# Patient Record
Sex: FEMALE | Race: BLACK OR AFRICAN AMERICAN | NOT HISPANIC OR LATINO
[De-identification: names, ages, dates, MRNs, and addresses within clinical notes are randomized per-mention and may not be internally consistent; named-entity substitution may affect disease eponyms.]

---

## 2017-07-25 PROBLEM — Z00.00 ENCOUNTER FOR PREVENTIVE HEALTH EXAMINATION: Status: ACTIVE | Noted: 2017-07-25

## 2017-08-16 ENCOUNTER — APPOINTMENT (OUTPATIENT)
Dept: OTOLARYNGOLOGY | Facility: CLINIC | Age: 42
End: 2017-08-16
Payer: COMMERCIAL

## 2017-08-16 VITALS
DIASTOLIC BLOOD PRESSURE: 100 MMHG | HEART RATE: 73 BPM | SYSTOLIC BLOOD PRESSURE: 166 MMHG | HEIGHT: 68 IN | WEIGHT: 204 LBS | BODY MASS INDEX: 30.92 KG/M2

## 2017-08-16 DIAGNOSIS — D49.0 NEOPLASM OF UNSPECIFIED BEHAVIOR OF DIGESTIVE SYSTEM: ICD-10-CM

## 2017-08-16 DIAGNOSIS — I10 ESSENTIAL (PRIMARY) HYPERTENSION: ICD-10-CM

## 2017-08-16 DIAGNOSIS — J45.909 UNSPECIFIED ASTHMA, UNCOMPLICATED: ICD-10-CM

## 2017-08-16 DIAGNOSIS — K21.0 GASTRO-ESOPHAGEAL REFLUX DISEASE WITH ESOPHAGITIS: ICD-10-CM

## 2017-08-16 DIAGNOSIS — J30.2 OTHER SEASONAL ALLERGIC RHINITIS: ICD-10-CM

## 2017-08-16 DIAGNOSIS — M26.623 ARTHRALGIA OF BILATERAL TEMPOROMANDIBULAR JOINT: ICD-10-CM

## 2017-08-16 DIAGNOSIS — Z82.49 FAMILY HISTORY OF ISCHEMIC HEART DISEASE AND OTHER DISEASES OF THE CIRCULATORY SYSTEM: ICD-10-CM

## 2017-08-16 DIAGNOSIS — R49.8 OTHER VOICE AND RESONANCE DISORDERS: ICD-10-CM

## 2017-08-16 DIAGNOSIS — H69.80 OTHER SPECIFIED DISORDERS OF EUSTACHIAN TUBE, UNSPECIFIED EAR: ICD-10-CM

## 2017-08-16 DIAGNOSIS — Z80.0 FAMILY HISTORY OF MALIGNANT NEOPLASM OF DIGESTIVE ORGANS: ICD-10-CM

## 2017-08-16 DIAGNOSIS — D50.9 IRON DEFICIENCY ANEMIA, UNSPECIFIED: ICD-10-CM

## 2017-08-16 DIAGNOSIS — Z78.9 OTHER SPECIFIED HEALTH STATUS: ICD-10-CM

## 2017-08-16 DIAGNOSIS — H92.03 OTALGIA, BILATERAL: ICD-10-CM

## 2017-08-16 DIAGNOSIS — Z80.49 FAMILY HISTORY OF MALIGNANT NEOPLASM OF OTHER GENITAL ORGANS: ICD-10-CM

## 2017-08-16 DIAGNOSIS — J35.1 HYPERTROPHY OF TONSILS: ICD-10-CM

## 2017-08-16 PROCEDURE — 92588 EVOKED AUDITORY TST COMPLETE: CPT

## 2017-08-16 PROCEDURE — 31575 DIAGNOSTIC LARYNGOSCOPY: CPT

## 2017-08-16 PROCEDURE — 92557 COMPREHENSIVE HEARING TEST: CPT

## 2017-08-16 PROCEDURE — 92550 TYMPANOMETRY & REFLEX THRESH: CPT

## 2017-08-16 PROCEDURE — 99204 OFFICE O/P NEW MOD 45 MIN: CPT | Mod: 25

## 2017-08-17 PROBLEM — H69.80 EUSTACHIAN TUBE DYSFUNCTION: Status: ACTIVE | Noted: 2017-08-17

## 2017-09-26 PROBLEM — Z80.49 FAMILY HISTORY OF MALIGNANT NEOPLASM OF UTERUS: Status: ACTIVE | Noted: 2017-09-26

## 2017-09-26 PROBLEM — H92.03 EAR PAIN, BILATERAL: Status: ACTIVE | Noted: 2017-09-26

## 2017-09-26 PROBLEM — J45.909 ASTHMA: Status: ACTIVE | Noted: 2017-09-26

## 2017-09-26 PROBLEM — R49.8 OTHER VOICE DISTURBANCE: Status: ACTIVE | Noted: 2017-09-26

## 2017-09-26 PROBLEM — D49.0 PAROTID NEOPLASM: Status: RESOLVED | Noted: 2017-09-26 | Resolved: 2017-09-26

## 2017-09-26 PROBLEM — D50.9 IRON DEFICIENCY ANEMIA: Status: ACTIVE | Noted: 2017-09-26

## 2017-09-26 PROBLEM — K21.0 CHRONIC REFLUX ESOPHAGITIS: Status: ACTIVE | Noted: 2017-09-26

## 2017-09-26 PROBLEM — I10 HYPERTENSION: Status: ACTIVE | Noted: 2017-09-26

## 2017-09-26 PROBLEM — J30.2 SEASONAL ALLERGIC RHINITIS, UNSPECIFIED CHRONICITY, UNSPECIFIED TRIGGER: Status: ACTIVE | Noted: 2017-09-26

## 2017-09-26 PROBLEM — Z82.49 FAMILY HISTORY OF HYPERTENSION: Status: ACTIVE | Noted: 2017-08-16

## 2017-09-26 PROBLEM — J35.1 HYPERTROPHY OF TONSIL: Status: ACTIVE | Noted: 2017-09-26

## 2017-09-26 PROBLEM — M26.623 BILATERAL TEMPOROMANDIBULAR JOINT PAIN: Status: ACTIVE | Noted: 2017-09-26

## 2017-09-26 RX ORDER — CETIRIZINE HCL 10 MG
10 TABLET ORAL
Refills: 0 | Status: ACTIVE | COMMUNITY

## 2017-09-26 RX ORDER — ESOMEPRAZOLE MAGNESIUM 20 MG/1
20 CAPSULE, DELAYED RELEASE ORAL
Refills: 0 | Status: ACTIVE | COMMUNITY

## 2018-07-25 PROBLEM — Z78.9 ALCOHOL USE: Status: ACTIVE | Noted: 2017-08-16

## 2018-07-25 PROBLEM — Z80.0 FAMILY HISTORY OF COLON CANCER: Status: ACTIVE | Noted: 2017-09-26

## 2022-06-24 ENCOUNTER — APPOINTMENT (OUTPATIENT)
Dept: COLORECTAL SURGERY | Facility: CLINIC | Age: 47
End: 2022-06-24

## 2022-06-24 VITALS
TEMPERATURE: 98.2 F | HEART RATE: 79 BPM | OXYGEN SATURATION: 98 % | WEIGHT: 214 LBS | HEIGHT: 68 IN | BODY MASS INDEX: 32.43 KG/M2 | DIASTOLIC BLOOD PRESSURE: 106 MMHG | SYSTOLIC BLOOD PRESSURE: 157 MMHG

## 2022-06-24 PROCEDURE — 99202 OFFICE O/P NEW SF 15 MIN: CPT

## 2022-06-24 NOTE — ASSESSMENT
[FreeTextEntry1] : 47 f W/ Crohn's (on Remicade); keen to have her colostomy reversed.\par Plan:\par Flex sigmoidoscopy to confirm non-inflamed anorectum.\par MRI scan - to assess anal fistula disease.\par Will plan for surgery after reviewing the above.\par All questions answered.

## 2022-06-24 NOTE — HISTORY OF PRESENT ILLNESS
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 Apart from occasional feeling of weakness, no other symptoms.\par She is enquiring about possible stoma reversal.

## 2022-06-24 NOTE — PHYSICAL EXAM
[Abdomen Masses] : No abdominal masses [Abdomen Tenderness] : ~T No ~M abdominal tenderness [Tender] : nontender [None] : no anal fissures seen [Normal Breath Sounds] : Normal breath sounds [No Rash or Lesion] : No rash or lesion [de-identified] : Normal; healthy functional colostomy [de-identified] : Perianal exam: scars of previous surgeries; no active fistulas [de-identified] : Normal [de-identified] : N [de-identified] : N [de-identified] : N [de-identified] : N [de-identified] : N [de-identified] : N [de-identified] : N

## 2022-07-07 ENCOUNTER — APPOINTMENT (OUTPATIENT)
Dept: COLORECTAL SURGERY | Facility: CLINIC | Age: 47
End: 2022-07-07

## 2022-07-07 PROCEDURE — 99212 OFFICE O/P EST SF 10 MIN: CPT

## 2022-07-07 NOTE — HISTORY OF PRESENT ILLNESS
[FreeTextEntry1] : 47 F, w/ hx of Crohn's drisease; recently underwent I&D of perianal abscess at [facility]. she is receiving Remicade by Dr Plata.\par She has completed the antibiotic course, and is doing sitz baths.\par She is having regular bowel movements.\par Reports some pain and she will be reaching out to her team at [facility] for analgesics.\par

## 2022-07-07 NOTE — ASSESSMENT
[FreeTextEntry1] : 47 F w/ hx of Crohn's disease; s/p I&D of perianal abscess at [facility].\par Doing better; still has some anal pain; On exam - no active abscess.\par Plan:\par Sitz baths.\par Analgesics as needed.\par Will schedule for EUA, possible seton placenment, possible cutting seton placement after a month.\par All questions answered.

## 2022-07-07 NOTE — PHYSICAL EXAM
[No HSM] : no hepatosplenomegaly [None] : no anal fissures seen [No Rash or Lesion] : No rash or lesion [Abdomen Masses] : No abdominal masses [Abdomen Tenderness] : ~T No ~M abdominal tenderness [de-identified] : Normal [de-identified] : No active abscess; no tenderness [de-identified] : Normal [de-identified] : N [de-identified] : N [de-identified] : N [de-identified] : N [de-identified] : N [de-identified] : N [de-identified] : N

## 2022-08-17 ENCOUNTER — NON-APPOINTMENT (OUTPATIENT)
Age: 47
End: 2022-08-17

## 2022-08-22 NOTE — ASU PATIENT PROFILE, ADULT - NSICDXPASTSURGICALHX_GEN_ALL_CORE_FT
PAST SURGICAL HISTORY:  Colostomy present LLQ, no leakage noted. Stoma red    H/O myomectomy     History of surgery left nek tumor excision

## 2022-08-22 NOTE — ASU PATIENT PROFILE, ADULT - FALL HARM RISK - UNIVERSAL INTERVENTIONS
Bed in lowest position, wheels locked, appropriate side rails in place/Call bell, personal items and telephone in reach/Instruct patient to call for assistance before getting out of bed or chair/Non-slip footwear when patient is out of bed/[place] to call system/Physically safe environment - no spills, clutter or unnecessary equipment/Purposeful Proactive Rounding/Room/bathroom lighting operational, light cord in reach

## 2022-08-22 NOTE — ASU PATIENT PROFILE, ADULT - NSICDXPASTMEDICALHX_GEN_ALL_CORE_FT
PAST MEDICAL HISTORY:  2019 novel coronavirus disease (COVID-19) 2020    Acid reflux     Anemia     Asthma     Crohn's disease     High blood pressure     Syncope r/t anemia

## 2022-08-22 NOTE — ASU PATIENT PROFILE, ADULT - ABLE TO REACH PT
Voicemail instruction left. Patient instructed to arrive at 8AM. No solid food, dairy, candy or gum after midnight, water is allowed before 7:00am tomorrow. Pt reminded to come with photo ID, vaccination and insurance card, escort must show proof of vaccination and photo ID. No jewelries/contact lens; dress in comfortable clothes; no smoking/alcohol drinking/illicit drug use tonight. Address and call back number given./no

## 2022-08-23 ENCOUNTER — APPOINTMENT (OUTPATIENT)
Dept: COLORECTAL SURGERY | Facility: CLINIC | Age: 47
End: 2022-08-23

## 2022-08-23 ENCOUNTER — TRANSCRIPTION ENCOUNTER (OUTPATIENT)
Age: 47
End: 2022-08-23

## 2022-08-23 ENCOUNTER — OUTPATIENT (OUTPATIENT)
Dept: OUTPATIENT SERVICES | Facility: HOSPITAL | Age: 47
LOS: 1 days | Discharge: HOME | End: 2022-08-23

## 2022-08-23 VITALS
WEIGHT: 227.08 LBS | HEART RATE: 68 BPM | TEMPERATURE: 97 F | RESPIRATION RATE: 16 BRPM | HEIGHT: 68 IN | OXYGEN SATURATION: 100 % | DIASTOLIC BLOOD PRESSURE: 82 MMHG | SYSTOLIC BLOOD PRESSURE: 138 MMHG

## 2022-08-23 VITALS
DIASTOLIC BLOOD PRESSURE: 92 MMHG | TEMPERATURE: 97 F | HEART RATE: 74 BPM | RESPIRATION RATE: 16 BRPM | SYSTOLIC BLOOD PRESSURE: 139 MMHG | OXYGEN SATURATION: 97 %

## 2022-08-23 DIAGNOSIS — Z93.3 COLOSTOMY STATUS: Chronic | ICD-10-CM

## 2022-08-23 DIAGNOSIS — Z98.890 OTHER SPECIFIED POSTPROCEDURAL STATES: Chronic | ICD-10-CM

## 2022-08-23 PROCEDURE — ZZZZZ: CPT

## 2022-08-23 PROCEDURE — 46600 DIAGNOSTIC ANOSCOPY SPX: CPT

## 2022-08-23 RX ORDER — SODIUM CHLORIDE 9 MG/ML
1000 INJECTION, SOLUTION INTRAVENOUS
Refills: 0 | Status: DISCONTINUED | OUTPATIENT
Start: 2022-08-23 | End: 2022-08-23

## 2022-08-23 RX ORDER — BENZOCAINE AND MENTHOL 5; 1 G/100ML; G/100ML
1 LIQUID ORAL ONCE
Refills: 0 | Status: COMPLETED | OUTPATIENT
Start: 2022-08-23 | End: 2022-08-23

## 2022-08-23 RX ORDER — OXYCODONE AND ACETAMINOPHEN 5; 325 MG/1; MG/1
1 TABLET ORAL EVERY 4 HOURS
Refills: 0 | Status: DISCONTINUED | OUTPATIENT
Start: 2022-08-23 | End: 2022-08-23

## 2022-08-23 RX ORDER — FENTANYL CITRATE 50 UG/ML
25 INJECTION INTRAVENOUS
Refills: 0 | Status: DISCONTINUED | OUTPATIENT
Start: 2022-08-23 | End: 2022-08-23

## 2022-08-23 RX ORDER — ONDANSETRON 8 MG/1
4 TABLET, FILM COATED ORAL ONCE
Refills: 0 | Status: DISCONTINUED | OUTPATIENT
Start: 2022-08-23 | End: 2022-08-23

## 2022-08-23 RX ADMIN — BENZOCAINE AND MENTHOL 1 LOZENGE: 5; 1 LIQUID ORAL at 10:57

## 2022-08-23 NOTE — ASU DISCHARGE PLAN (ADULT/PEDIATRIC) - ASU DC SPECIAL INSTRUCTIONSFT
Will have a small amount of bleeding today.    No fistulas were found on exam. Will be in contact about scheduling colostomy reversal.

## 2022-08-23 NOTE — ASU DISCHARGE PLAN (ADULT/PEDIATRIC) - NS MD DC FALL RISK RISK
For information on Fall & Injury Prevention, visit: [email or web address] OR  [email or web address] OR  https://www.cdc.gov/steadi/patient.html

## 2022-08-23 NOTE — ASU PREOP CHECKLIST - SITE MARKED BY SURGEON
Discharge and education instructions reviewed. Patient verbalized understanding, teach-back successful. Patient denied questions at this time. No acute distress noted. Patient instructed to follow-up as noted - return to emergency department if symptoms worsen. Patient verbalized understanding. Discharged per EDMD with discharged instructions.        Wm Singer RN  02/26/20 3253
Dr. Kayla You at bedside.       Jose Nicholas RN  02/26/20 6861
Handoff received from [facility] to assume care of pt.       Keyon Chiang, JOSE  02/26/20 2800
Pt out of room by stretcher to CT.       Janes Sears RN  02/26/20 1245
dos

## 2022-09-08 PROBLEM — J45.909 UNSPECIFIED ASTHMA, UNCOMPLICATED: Chronic | Status: ACTIVE | Noted: 2022-08-23

## 2022-09-08 PROBLEM — I10 ESSENTIAL (PRIMARY) HYPERTENSION: Chronic | Status: ACTIVE | Noted: 2022-08-23

## 2022-09-08 PROBLEM — R55 SYNCOPE AND COLLAPSE: Chronic | Status: ACTIVE | Noted: 2022-08-23

## 2022-09-08 PROBLEM — K21.9 GASTRO-ESOPHAGEAL REFLUX DISEASE WITHOUT ESOPHAGITIS: Chronic | Status: ACTIVE | Noted: 2022-08-23

## 2022-09-08 PROBLEM — K50.90 CROHN'S DISEASE, UNSPECIFIED, WITHOUT COMPLICATIONS: Chronic | Status: ACTIVE | Noted: 2022-08-23

## 2022-09-15 ENCOUNTER — APPOINTMENT (OUTPATIENT)
Dept: COLORECTAL SURGERY | Facility: CLINIC | Age: 47
End: 2022-09-15

## 2022-09-22 ENCOUNTER — APPOINTMENT (OUTPATIENT)
Dept: COLORECTAL SURGERY | Facility: CLINIC | Age: 47
End: 2022-09-22

## 2022-09-30 ENCOUNTER — APPOINTMENT (OUTPATIENT)
Dept: COLORECTAL SURGERY | Facility: CLINIC | Age: 47
End: 2022-09-30

## 2022-09-30 VITALS
DIASTOLIC BLOOD PRESSURE: 118 MMHG | BODY MASS INDEX: 34.71 KG/M2 | TEMPERATURE: 98.4 F | SYSTOLIC BLOOD PRESSURE: 197 MMHG | HEART RATE: 65 BPM | HEIGHT: 68 IN | OXYGEN SATURATION: 100 % | WEIGHT: 229 LBS

## 2022-09-30 DIAGNOSIS — K60.3 ANAL FISTULA: ICD-10-CM

## 2022-09-30 PROCEDURE — 99212 OFFICE O/P EST SF 10 MIN: CPT

## 2022-09-30 RX ORDER — PREDNISONE 20 MG/1
20 TABLET ORAL DAILY
Qty: 5 | Refills: 0 | Status: COMPLETED | COMMUNITY
Start: 2017-08-17 | End: 2022-09-30

## 2022-09-30 RX ORDER — FOLIC ACID 1 MG/1
1 TABLET ORAL
Refills: 0 | Status: COMPLETED | COMMUNITY
End: 2022-09-30

## 2022-09-30 RX ORDER — FLUTICASONE PROPIONATE 220 MCG
AEROSOL WITH ADAPTER (GRAM) INHALATION
Refills: 0 | Status: COMPLETED | COMMUNITY
End: 2022-09-30

## 2022-09-30 RX ORDER — FLUTICASONE PROPIONATE 50 UG/1
50 SPRAY, METERED NASAL DAILY
Qty: 1 | Refills: 3 | Status: COMPLETED | COMMUNITY
Start: 2017-08-17 | End: 2022-09-30

## 2022-09-30 RX ORDER — IRON/IRON ASP GLY/FA/MV-MIN 38 125-25-1MG
TABLET ORAL
Refills: 0 | Status: COMPLETED | COMMUNITY
End: 2022-09-30

## 2022-09-30 RX ORDER — MONTELUKAST SODIUM 10 MG/1
TABLET, FILM COATED ORAL
Refills: 0 | Status: COMPLETED | COMMUNITY
End: 2022-09-30

## 2022-09-30 RX ORDER — ALBUTEROL SULFATE 90 UG/1
INHALANT RESPIRATORY (INHALATION)
Refills: 0 | Status: COMPLETED | COMMUNITY
End: 2022-09-30

## 2022-09-30 NOTE — HISTORY OF PRESENT ILLNESS
[FreeTextEntry1] : 47 year old female patient s/p EUA and anoscopy on 8/23/22.  Pt with history of Crohn's, anal fistula, abscess and diverting colostomy \par \par Here for follow up\par Pt feeling tired, recovering from Covid\par Ostomy is good, denies leaks\par Has shoulder, joint and hip pain on left side\par Urge to pass a BM through her rectum\par Denies fevers\par Appetite is good\par Hoping to do a reversal soon.\par \par Her recent EUA showed no active anal fistula or abscess.\par \par Had a Remicade infusion on 9/6 next one is in November\par \par

## 2022-09-30 NOTE — PHYSICAL EXAM
[Exam Deferred] : exam was deferred [No Rash or Lesion] : No rash or lesion [Alert] : alert [Oriented to Person] : oriented to person [Oriented to Place] : oriented to place [Oriented to Time] : oriented to time [Calm] : calm [de-identified] : Normal w/ functioning colostomy [de-identified] : Normal [de-identified] : Normal [de-identified] : Normal [de-identified] : Normal [de-identified] : Normal

## 2022-09-30 NOTE — ASSESSMENT
[FreeTextEntry1] : 47 F w/ hx of Crohn's.\par Her anal fistulas and abscesses have healed.\par Plan:\par To see her GI MD for clearance for colostomy reversal.\par Will schedule for surgery once we get the clearance.\par All questions answered.

## 2022-10-07 NOTE — HISTORY OF PRESENT ILLNESS
[FreeTextEntry1] : 47 year old female patient s/p EUA and anoscopy on 8/23.  Pt with history Crohn's, anal fistula and abscess and diverting colostomy\par \par Here for follow up

## 2022-10-27 ENCOUNTER — NON-APPOINTMENT (OUTPATIENT)
Age: 47
End: 2022-10-27

## 2022-11-28 DIAGNOSIS — Z01.818 ENCOUNTER FOR OTHER PREPROCEDURAL EXAMINATION: ICD-10-CM

## 2022-11-29 ENCOUNTER — APPOINTMENT (OUTPATIENT)
Dept: COLORECTAL SURGERY | Facility: HOSPITAL | Age: 47
End: 2022-11-29

## 2022-12-04 ENCOUNTER — NON-APPOINTMENT (OUTPATIENT)
Age: 47
End: 2022-12-04

## 2022-12-08 VITALS
HEART RATE: 86 BPM | HEIGHT: 68 IN | SYSTOLIC BLOOD PRESSURE: 163 MMHG | DIASTOLIC BLOOD PRESSURE: 94 MMHG | TEMPERATURE: 98 F | OXYGEN SATURATION: 100 % | WEIGHT: 224.65 LBS | RESPIRATION RATE: 16 BRPM

## 2022-12-08 RX ORDER — FLUTICASONE PROPIONATE 220 MCG
0 AEROSOL WITH ADAPTER (GRAM) INHALATION
Qty: 0 | Refills: 0 | DISCHARGE

## 2022-12-08 RX ORDER — NIFEDIPINE 30 MG
1 TABLET, EXTENDED RELEASE 24 HR ORAL
Qty: 0 | Refills: 0 | DISCHARGE

## 2022-12-08 RX ORDER — INFLUENZA VIRUS VACCINE 15; 15; 15; 15 UG/.5ML; UG/.5ML; UG/.5ML; UG/.5ML
0.5 SUSPENSION INTRAMUSCULAR ONCE
Refills: 0 | Status: DISCONTINUED | OUTPATIENT
Start: 2022-12-09 | End: 2022-12-12

## 2022-12-08 NOTE — PRE-OP CHECKLIST - ADVANCE DIRECTIVE ADDRESSED/READDRESSED
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

## 2022-12-09 ENCOUNTER — INPATIENT (INPATIENT)
Facility: HOSPITAL | Age: 47
LOS: 2 days | Discharge: HOME | DRG: 331 | End: 2022-12-12
Attending: COLON & RECTAL SURGERY | Admitting: COLON & RECTAL SURGERY
Payer: COMMERCIAL

## 2022-12-09 ENCOUNTER — APPOINTMENT (OUTPATIENT)
Dept: COLORECTAL SURGERY | Facility: HOSPITAL | Age: 47
End: 2022-12-09

## 2022-12-09 ENCOUNTER — RESULT REVIEW (OUTPATIENT)
Age: 47
End: 2022-12-09

## 2022-12-09 ENCOUNTER — TRANSCRIPTION ENCOUNTER (OUTPATIENT)
Age: 47
End: 2022-12-09

## 2022-12-09 DIAGNOSIS — Z98.890 OTHER SPECIFIED POSTPROCEDURAL STATES: Chronic | ICD-10-CM

## 2022-12-09 DIAGNOSIS — R11.0 NAUSEA: ICD-10-CM

## 2022-12-09 DIAGNOSIS — J45.909 UNSPECIFIED ASTHMA, UNCOMPLICATED: ICD-10-CM

## 2022-12-09 DIAGNOSIS — Z88.6 ALLERGY STATUS TO ANALGESIC AGENT: ICD-10-CM

## 2022-12-09 DIAGNOSIS — Z43.3 ENCOUNTER FOR ATTENTION TO COLOSTOMY: ICD-10-CM

## 2022-12-09 DIAGNOSIS — I10 ESSENTIAL (PRIMARY) HYPERTENSION: ICD-10-CM

## 2022-12-09 DIAGNOSIS — Z90.49 ACQUIRED ABSENCE OF OTHER SPECIFIED PARTS OF DIGESTIVE TRACT: Chronic | ICD-10-CM

## 2022-12-09 DIAGNOSIS — Z93.3 COLOSTOMY STATUS: Chronic | ICD-10-CM

## 2022-12-09 DIAGNOSIS — Z86.16 PERSONAL HISTORY OF COVID-19: ICD-10-CM

## 2022-12-09 DIAGNOSIS — K21.9 GASTRO-ESOPHAGEAL REFLUX DISEASE WITHOUT ESOPHAGITIS: ICD-10-CM

## 2022-12-09 LAB
BLD GP AB SCN SERPL QL: NEGATIVE — SIGNIFICANT CHANGE UP
GLUCOSE BLDC GLUCOMTR-MCNC: 119 MG/DL — HIGH (ref 70–99)
RH IG SCN BLD-IMP: POSITIVE — SIGNIFICANT CHANGE UP

## 2022-12-09 PROCEDURE — ZZZZZ: CPT

## 2022-12-09 PROCEDURE — 44620 REPAIR BOWEL OPENING: CPT

## 2022-12-09 PROCEDURE — 88304 TISSUE EXAM BY PATHOLOGIST: CPT | Mod: 26

## 2022-12-09 RX ORDER — CETIRIZINE HYDROCHLORIDE 10 MG/1
1 TABLET ORAL
Qty: 0 | Refills: 0 | DISCHARGE

## 2022-12-09 RX ORDER — NIFEDIPINE 30 MG
60 TABLET, EXTENDED RELEASE 24 HR ORAL DAILY
Refills: 0 | Status: DISCONTINUED | OUTPATIENT
Start: 2022-12-10 | End: 2022-12-12

## 2022-12-09 RX ORDER — FAMOTIDINE 10 MG/ML
20 INJECTION INTRAVENOUS ONCE
Refills: 0 | Status: COMPLETED | OUTPATIENT
Start: 2022-12-09 | End: 2022-12-09

## 2022-12-09 RX ORDER — BENZOCAINE AND MENTHOL 5; 1 G/100ML; G/100ML
1 LIQUID ORAL ONCE
Refills: 0 | Status: COMPLETED | OUTPATIENT
Start: 2022-12-09 | End: 2022-12-09

## 2022-12-09 RX ORDER — OXYCODONE HYDROCHLORIDE 5 MG/1
2.5 TABLET ORAL EVERY 4 HOURS
Refills: 0 | Status: DISCONTINUED | OUTPATIENT
Start: 2022-12-10 | End: 2022-12-12

## 2022-12-09 RX ORDER — ALBUTEROL 90 UG/1
2 AEROSOL, METERED ORAL
Qty: 0 | Refills: 0 | DISCHARGE

## 2022-12-09 RX ORDER — SODIUM CHLORIDE 9 MG/ML
1000 INJECTION, SOLUTION INTRAVENOUS
Refills: 0 | Status: DISCONTINUED | OUTPATIENT
Start: 2022-12-09 | End: 2022-12-12

## 2022-12-09 RX ORDER — ACETAMINOPHEN 500 MG
1000 TABLET ORAL ONCE
Refills: 0 | Status: COMPLETED | OUTPATIENT
Start: 2022-12-09 | End: 2022-12-09

## 2022-12-09 RX ORDER — ACETAMINOPHEN 500 MG
1000 TABLET ORAL EVERY 6 HOURS
Refills: 0 | Status: COMPLETED | OUTPATIENT
Start: 2022-12-09 | End: 2022-12-10

## 2022-12-09 RX ORDER — HYDROMORPHONE HYDROCHLORIDE 2 MG/ML
0.25 INJECTION INTRAMUSCULAR; INTRAVENOUS; SUBCUTANEOUS ONCE
Refills: 0 | Status: DISCONTINUED | OUTPATIENT
Start: 2022-12-09 | End: 2022-12-09

## 2022-12-09 RX ORDER — ESOMEPRAZOLE MAGNESIUM 40 MG/1
1 CAPSULE, DELAYED RELEASE ORAL
Qty: 0 | Refills: 0 | DISCHARGE

## 2022-12-09 RX ORDER — HEPARIN SODIUM 5000 [USP'U]/ML
5000 INJECTION INTRAVENOUS; SUBCUTANEOUS EVERY 8 HOURS
Refills: 0 | Status: DISCONTINUED | OUTPATIENT
Start: 2022-12-09 | End: 2022-12-12

## 2022-12-09 RX ORDER — HEPARIN SODIUM 5000 [USP'U]/ML
5000 INJECTION INTRAVENOUS; SUBCUTANEOUS ONCE
Refills: 0 | Status: COMPLETED | OUTPATIENT
Start: 2022-12-09 | End: 2022-12-09

## 2022-12-09 RX ORDER — OXYCODONE HYDROCHLORIDE 5 MG/1
5 TABLET ORAL EVERY 4 HOURS
Refills: 0 | Status: DISCONTINUED | OUTPATIENT
Start: 2022-12-10 | End: 2022-12-12

## 2022-12-09 RX ORDER — ONDANSETRON 8 MG/1
4 TABLET, FILM COATED ORAL EVERY 6 HOURS
Refills: 0 | Status: DISCONTINUED | OUTPATIENT
Start: 2022-12-09 | End: 2022-12-12

## 2022-12-09 RX ORDER — ONDANSETRON 8 MG/1
4 TABLET, FILM COATED ORAL ONCE
Refills: 0 | Status: COMPLETED | OUTPATIENT
Start: 2022-12-09 | End: 2022-12-09

## 2022-12-09 RX ORDER — NIFEDIPINE 30 MG
1 TABLET, EXTENDED RELEASE 24 HR ORAL
Qty: 0 | Refills: 0 | DISCHARGE

## 2022-12-09 RX ADMIN — BENZOCAINE AND MENTHOL 1 LOZENGE: 5; 1 LIQUID ORAL at 23:42

## 2022-12-09 RX ADMIN — Medication 400 MILLIGRAM(S): at 12:42

## 2022-12-09 RX ADMIN — Medication 1000 MILLIGRAM(S): at 17:15

## 2022-12-09 RX ADMIN — FAMOTIDINE 20 MILLIGRAM(S): 10 INJECTION INTRAVENOUS at 11:07

## 2022-12-09 RX ADMIN — Medication 1000 MILLIGRAM(S): at 07:10

## 2022-12-09 RX ADMIN — ONDANSETRON 4 MILLIGRAM(S): 8 TABLET, FILM COATED ORAL at 11:07

## 2022-12-09 RX ADMIN — Medication 400 MILLIGRAM(S): at 23:42

## 2022-12-09 RX ADMIN — Medication 400 MILLIGRAM(S): at 17:01

## 2022-12-09 RX ADMIN — HEPARIN SODIUM 5000 UNIT(S): 5000 INJECTION INTRAVENOUS; SUBCUTANEOUS at 07:11

## 2022-12-09 RX ADMIN — HYDROMORPHONE HYDROCHLORIDE 0.25 MILLIGRAM(S): 2 INJECTION INTRAMUSCULAR; INTRAVENOUS; SUBCUTANEOUS at 11:07

## 2022-12-09 RX ADMIN — ONDANSETRON 4 MILLIGRAM(S): 8 TABLET, FILM COATED ORAL at 16:32

## 2022-12-09 RX ADMIN — SODIUM CHLORIDE 40 MILLILITER(S): 9 INJECTION, SOLUTION INTRAVENOUS at 12:42

## 2022-12-09 RX ADMIN — HYDROMORPHONE HYDROCHLORIDE 0.25 MILLIGRAM(S): 2 INJECTION INTRAMUSCULAR; INTRAVENOUS; SUBCUTANEOUS at 11:14

## 2022-12-09 RX ADMIN — HEPARIN SODIUM 5000 UNIT(S): 5000 INJECTION INTRAVENOUS; SUBCUTANEOUS at 17:01

## 2022-12-09 RX ADMIN — Medication 1000 MILLIGRAM(S): at 13:40

## 2022-12-09 NOTE — H&P ADULT - NSHPPHYSICALEXAM_GEN_ALL_CORE
Gastrointestinal: No abdominal masses. No abdominal tenderness. Normal.   Liver:. no hepatosplenomegaly.   Rectal :. No active abscess; no tenderness. No anal fissures seen.   General Appearance: Normal.   HEENT: N.   Neck:. N.   Respiratory:. N.   Genitourinary: N.   Musculoskeletal: N.   Skin:. no rash or lesion.   Neurologic:. N.   Psychiatric:. N.

## 2022-12-09 NOTE — H&P ADULT - NSICDXPASTMEDICALHX_GEN_ALL_CORE_FT
PAST MEDICAL HISTORY:  2019 novel coronavirus disease (COVID-19) 2020; 9/2022    Acid reflux     Anal fistula     Asthma     Crohn's disease     High blood pressure     Syncope r/t anemia

## 2022-12-09 NOTE — PRE-ANESTHESIA EVALUATION ADULT - NSANTHOSAYNRD_GEN_A_CORE
No. SONA screening performed.  STOP BANG Legend: 0-2 = LOW Risk; 3-4 = INTERMEDIATE Risk; 5-8 = HIGH Risk

## 2022-12-09 NOTE — H&P ADULT - NSICDXPASTSURGICALHX_GEN_ALL_CORE_FT
PAST SURGICAL HISTORY:  Colostomy present     H/O myomectomy     History of colon resection 12/2021    History of surgery left neck tumor excision

## 2022-12-09 NOTE — BRIEF OPERATIVE NOTE - OPERATION/FINDINGS
circumferential incision around colostomy,deepened to fascia . transverse colostomy freed from surrounding adhesions.  colostomy trimmed. closure in double layers; first with undyed vicryl 3/0 in running manner followed by silk 3/0 also in running manner.  closure fascia with pds 1/0 in interrupted manner. washout of wound  with saline.  subcut layer closed with vicryl 3/0 in interrupted fashion.25mls of  Marcain 0.25% infiltrated.

## 2022-12-09 NOTE — H&P ADULT - ASSESSMENT
This is 46yo s/p Colostomy (2021) or crohns perianal disease. Had biological treatment. EUA 2022 no active perianal  crohns disease. She is here for colostomy  reversal.  1. Consent  2. Heparin 3. Type/SCreen.

## 2022-12-09 NOTE — PROGRESS NOTE ADULT - SUBJECTIVE AND OBJECTIVE BOX
Post Op Check:     12/9: colostomy reversal     SUBJECTIVE: Pt seen and examined at bedside this am by surgery team. Pain well controlled. Denies f/n/v/cp/sob.    MEDICATIONS  (STANDING):  acetaminophen   IVPB .. 1000 milliGRAM(s) IV Intermittent every 6 hours  heparin   Injectable 5000 Unit(s) SubCutaneous every 8 hours  influenza   Vaccine 0.5 milliLiter(s) IntraMuscular once  lactated ringers. 1000 milliLiter(s) (40 mL/Hr) IV Continuous <Continuous>    MEDICATIONS  (PRN):      Vital Signs Last 24 Hrs  T(C): 36.5 (09 Dec 2022 12:38), Max: 36.6 (09 Dec 2022 09:44)  T(F): 97.7 (09 Dec 2022 12:38), Max: 97.8 (09 Dec 2022 09:44)  HR: 89 (09 Dec 2022 12:38) (70 - 89)  BP: 142/87 (09 Dec 2022 12:38) (114/64 - 163/94)  BP(mean): 107 (09 Dec 2022 12:02) (85 - 107)  RR: 17 (09 Dec 2022 12:38) (16 - 19)  SpO2: 100% (09 Dec 2022 12:38) (99% - 100%)    Parameters below as of 09 Dec 2022 12:38  Patient On (Oxygen Delivery Method): mask, nonrebreather  O2 Flow (L/min): 10      PHYSICAL EXAM:  Constitutional: A&Ox3  Respiratory: non labored breathing, no respiratory distress  Cardiovascular: NSR, RRR  Gastrointestinal: soft, nondistended, appropriately tender to palpation   Incision: site c/d/i   Genitourinary: pending TOV   Extremities: WWP, no calf tenderness or edema, SCDs in place                   I&O's Detail      LABS:                RADIOLOGY & ADDITIONAL STUDIES:

## 2022-12-09 NOTE — H&P ADULT - HISTORY OF PRESENT ILLNESS
This is a 48yo lady s/p Colostomy for Crohns perianal disease. Underwent biological Rx by GI. She has completed her treatment and is now  for reversal. She has no active infection.

## 2022-12-10 LAB
ANION GAP SERPL CALC-SCNC: 12 MMOL/L — SIGNIFICANT CHANGE UP (ref 5–17)
BUN SERPL-MCNC: 20 MG/DL — SIGNIFICANT CHANGE UP (ref 7–23)
CALCIUM SERPL-MCNC: 9.2 MG/DL — SIGNIFICANT CHANGE UP (ref 8.4–10.5)
CHLORIDE SERPL-SCNC: 98 MMOL/L — SIGNIFICANT CHANGE UP (ref 96–108)
CO2 SERPL-SCNC: 25 MMOL/L — SIGNIFICANT CHANGE UP (ref 22–31)
CREAT SERPL-MCNC: 1.42 MG/DL — HIGH (ref 0.5–1.3)
EGFR: 46 ML/MIN/1.73M2 — LOW
GLUCOSE SERPL-MCNC: 125 MG/DL — HIGH (ref 70–99)
HCT VFR BLD CALC: 33 % — LOW (ref 34.5–45)
HGB BLD-MCNC: 11 G/DL — LOW (ref 11.5–15.5)
MAGNESIUM SERPL-MCNC: 1.9 MG/DL — SIGNIFICANT CHANGE UP (ref 1.6–2.6)
MCHC RBC-ENTMCNC: 31.9 PG — SIGNIFICANT CHANGE UP (ref 27–34)
MCHC RBC-ENTMCNC: 33.3 GM/DL — SIGNIFICANT CHANGE UP (ref 32–36)
MCV RBC AUTO: 95.7 FL — SIGNIFICANT CHANGE UP (ref 80–100)
NRBC # BLD: 0 /100 WBCS — SIGNIFICANT CHANGE UP (ref 0–0)
PHOSPHATE SERPL-MCNC: 3.4 MG/DL — SIGNIFICANT CHANGE UP (ref 2.5–4.5)
PLATELET # BLD AUTO: 220 K/UL — SIGNIFICANT CHANGE UP (ref 150–400)
POTASSIUM SERPL-MCNC: 4.2 MMOL/L — SIGNIFICANT CHANGE UP (ref 3.5–5.3)
POTASSIUM SERPL-SCNC: 4.2 MMOL/L — SIGNIFICANT CHANGE UP (ref 3.5–5.3)
RBC # BLD: 3.45 M/UL — LOW (ref 3.8–5.2)
RBC # FLD: 13.2 % — SIGNIFICANT CHANGE UP (ref 10.3–14.5)
SODIUM SERPL-SCNC: 135 MMOL/L — SIGNIFICANT CHANGE UP (ref 135–145)
WBC # BLD: 14.62 K/UL — HIGH (ref 3.8–10.5)
WBC # FLD AUTO: 14.62 K/UL — HIGH (ref 3.8–10.5)

## 2022-12-10 RX ORDER — SIMETHICONE 80 MG/1
80 TABLET, CHEWABLE ORAL ONCE
Refills: 0 | Status: COMPLETED | OUTPATIENT
Start: 2022-12-10 | End: 2022-12-10

## 2022-12-10 RX ORDER — ALBUTEROL 90 UG/1
2 AEROSOL, METERED ORAL EVERY 6 HOURS
Refills: 0 | Status: DISCONTINUED | OUTPATIENT
Start: 2022-12-10 | End: 2022-12-12

## 2022-12-10 RX ORDER — SODIUM CHLORIDE 9 MG/ML
500 INJECTION, SOLUTION INTRAVENOUS ONCE
Refills: 0 | Status: COMPLETED | OUTPATIENT
Start: 2022-12-10 | End: 2022-12-10

## 2022-12-10 RX ORDER — LANOLIN ALCOHOL/MO/W.PET/CERES
5 CREAM (GRAM) TOPICAL AT BEDTIME
Refills: 0 | Status: DISCONTINUED | OUTPATIENT
Start: 2022-12-10 | End: 2022-12-12

## 2022-12-10 RX ORDER — LANOLIN ALCOHOL/MO/W.PET/CERES
1 CREAM (GRAM) TOPICAL AT BEDTIME
Refills: 0 | Status: DISCONTINUED | OUTPATIENT
Start: 2022-12-10 | End: 2022-12-10

## 2022-12-10 RX ORDER — ACETAMINOPHEN 500 MG
650 TABLET ORAL EVERY 6 HOURS
Refills: 0 | Status: DISCONTINUED | OUTPATIENT
Start: 2022-12-10 | End: 2022-12-12

## 2022-12-10 RX ADMIN — Medication 650 MILLIGRAM(S): at 21:38

## 2022-12-10 RX ADMIN — ONDANSETRON 4 MILLIGRAM(S): 8 TABLET, FILM COATED ORAL at 17:29

## 2022-12-10 RX ADMIN — HEPARIN SODIUM 5000 UNIT(S): 5000 INJECTION INTRAVENOUS; SUBCUTANEOUS at 00:03

## 2022-12-10 RX ADMIN — Medication 1000 MILLIGRAM(S): at 00:00

## 2022-12-10 RX ADMIN — ONDANSETRON 4 MILLIGRAM(S): 8 TABLET, FILM COATED ORAL at 06:26

## 2022-12-10 RX ADMIN — ALBUTEROL 2 PUFF(S): 90 AEROSOL, METERED ORAL at 12:05

## 2022-12-10 RX ADMIN — Medication 400 MILLIGRAM(S): at 05:39

## 2022-12-10 RX ADMIN — Medication 650 MILLIGRAM(S): at 22:38

## 2022-12-10 RX ADMIN — Medication 5 MILLIGRAM(S): at 23:54

## 2022-12-10 RX ADMIN — HEPARIN SODIUM 5000 UNIT(S): 5000 INJECTION INTRAVENOUS; SUBCUTANEOUS at 09:57

## 2022-12-10 RX ADMIN — SIMETHICONE 80 MILLIGRAM(S): 80 TABLET, CHEWABLE ORAL at 02:18

## 2022-12-10 RX ADMIN — ONDANSETRON 4 MILLIGRAM(S): 8 TABLET, FILM COATED ORAL at 00:54

## 2022-12-10 RX ADMIN — Medication 60 MILLIGRAM(S): at 05:40

## 2022-12-10 RX ADMIN — HEPARIN SODIUM 5000 UNIT(S): 5000 INJECTION INTRAVENOUS; SUBCUTANEOUS at 17:13

## 2022-12-10 RX ADMIN — SODIUM CHLORIDE 500 MILLILITER(S): 9 INJECTION, SOLUTION INTRAVENOUS at 08:58

## 2022-12-10 RX ADMIN — SODIUM CHLORIDE 40 MILLILITER(S): 9 INJECTION, SOLUTION INTRAVENOUS at 15:00

## 2022-12-10 NOTE — PROGRESS NOTE ADULT - SUBJECTIVE AND OBJECTIVE BOX
STATUS POST:  12/9: Colostomy reversal    POST OPERATIVE DAY #: 1    SUBJECTIVE: Pt seen and examined at bedside this am by surgery team. Tolerating clear liquid diet, has not yet passed gas or had bowel movement. Will continue to monitor return of bowel function. Has ambulated in hallway. Packing changed to WTD. Albuterol ordered.  Pain well controlled. Denies f/n/v/cp/sob.    Vital Signs Last 24 Hrs  T(C): 37.1 (10 Dec 2022 08:42), Max: 37.1 (10 Dec 2022 08:42)  T(F): 98.7 (10 Dec 2022 08:42), Max: 98.7 (10 Dec 2022 08:42)  HR: 79 (10 Dec 2022 08:42) (70 - 104)  BP: 153/93 (10 Dec 2022 08:42) (122/86 - 153/93)  BP(mean): 107 (09 Dec 2022 12:02) (96 - 107)  RR: 17 (10 Dec 2022 08:42) (16 - 19)  SpO2: 97% (10 Dec 2022 08:42) (97% - 100%)    Parameters below as of 10 Dec 2022 08:42  Patient On (Oxygen Delivery Method): room air        PHYSICAL EXAM:   Gen: Awake, alert, NAD, resting comfortably   CV: RRR  Pulm: no respiratory distress on RA  Abd: soft, ND, NTTP, no rebound or guarding   Ext: WWP, no edema, SCDs in place     I&O's Detail    09 Dec 2022 07:01  -  10 Dec 2022 07:00  --------------------------------------------------------  IN:    Lactated Ringers: 760 mL    Oral Fluid: 360 mL  Total IN: 1120 mL    OUT:    Voided (mL): 625 mL  Total OUT: 625 mL    Total NET: 495 mL      10 Dec 2022 07:01  -  10 Dec 2022 10:12  --------------------------------------------------------  IN:    Lactated Ringers: 160 mL    Lactated Ringers Bolus: 500 mL  Total IN: 660 mL    OUT:    Voided (mL): 430 mL  Total OUT: 430 mL    Total NET: 230 mL          LABS:                        11.0   14.62 )-----------( 220      ( 10 Dec 2022 05:30 )             33.0     12-10    135  |  98  |  20  ----------------------------<  125<H>  4.2   |  25  |  1.42<H>    Ca    9.2      10 Dec 2022 05:30  Phos  3.4     12-10  Mg     1.9     12-10          CAPILLARY BLOOD GLUCOSE          RADIOLOGY & ADDITIONAL STUDIES:   STATUS POST:  12/9: Colostomy reversal    POST OPERATIVE DAY #: 1    SUBJECTIVE: Pt seen and examined at bedside this am by surgery team. Tolerating clear liquid diet, has not yet passed gas or had bowel movement. Will continue to monitor return of bowel function. Has ambulated in hallway. Packing changed to WTD. Was coughing at bedside this morning, restarted home Albuterol .  Pain well controlled. Denies f/n/v/cp/sob.    Vital Signs Last 24 Hrs  T(C): 37.1 (10 Dec 2022 08:42), Max: 37.1 (10 Dec 2022 08:42)  T(F): 98.7 (10 Dec 2022 08:42), Max: 98.7 (10 Dec 2022 08:42)  HR: 79 (10 Dec 2022 08:42) (70 - 104)  BP: 153/93 (10 Dec 2022 08:42) (122/86 - 153/93)  BP(mean): 107 (09 Dec 2022 12:02) (96 - 107)  RR: 17 (10 Dec 2022 08:42) (16 - 19)  SpO2: 97% (10 Dec 2022 08:42) (97% - 100%)    Parameters below as of 10 Dec 2022 08:42  Patient On (Oxygen Delivery Method): room air        PHYSICAL EXAM:   Gen: Awake, alert, NAD, resting comfortably   CV: RRR  Pulm: coughing, no respiratory distress on RA, no wheezing   Abd: soft, ND, appropriately tender, no rebound or guarding, incision c/d/i, betadine packing removed and packed with WTD   Ext: WWP, no edema, SCDs in place     I&O's Detail    09 Dec 2022 07:01  -  10 Dec 2022 07:00  --------------------------------------------------------  IN:    Lactated Ringers: 760 mL    Oral Fluid: 360 mL  Total IN: 1120 mL    OUT:    Voided (mL): 625 mL  Total OUT: 625 mL    Total NET: 495 mL      10 Dec 2022 07:01  -  10 Dec 2022 10:12  --------------------------------------------------------  IN:    Lactated Ringers: 160 mL    Lactated Ringers Bolus: 500 mL  Total IN: 660 mL    OUT:    Voided (mL): 430 mL  Total OUT: 430 mL    Total NET: 230 mL          LABS:                        11.0   14.62 )-----------( 220      ( 10 Dec 2022 05:30 )             33.0     12-10    135  |  98  |  20  ----------------------------<  125<H>  4.2   |  25  |  1.42<H>    Ca    9.2      10 Dec 2022 05:30  Phos  3.4     12-10  Mg     1.9     12-10          CAPILLARY BLOOD GLUCOSE          RADIOLOGY & ADDITIONAL STUDIES:

## 2022-12-11 LAB
ANION GAP SERPL CALC-SCNC: 9 MMOL/L — SIGNIFICANT CHANGE UP (ref 5–17)
BUN SERPL-MCNC: 13 MG/DL — SIGNIFICANT CHANGE UP (ref 7–23)
CALCIUM SERPL-MCNC: 8.9 MG/DL — SIGNIFICANT CHANGE UP (ref 8.4–10.5)
CHLORIDE SERPL-SCNC: 103 MMOL/L — SIGNIFICANT CHANGE UP (ref 96–108)
CO2 SERPL-SCNC: 27 MMOL/L — SIGNIFICANT CHANGE UP (ref 22–31)
CREAT SERPL-MCNC: 1.04 MG/DL — SIGNIFICANT CHANGE UP (ref 0.5–1.3)
EGFR: 66 ML/MIN/1.73M2 — SIGNIFICANT CHANGE UP
GLUCOSE SERPL-MCNC: 94 MG/DL — SIGNIFICANT CHANGE UP (ref 70–99)
HCT VFR BLD CALC: 31.2 % — LOW (ref 34.5–45)
HGB BLD-MCNC: 10.2 G/DL — LOW (ref 11.5–15.5)
MAGNESIUM SERPL-MCNC: 2 MG/DL — SIGNIFICANT CHANGE UP (ref 1.6–2.6)
MCHC RBC-ENTMCNC: 32 PG — SIGNIFICANT CHANGE UP (ref 27–34)
MCHC RBC-ENTMCNC: 32.7 GM/DL — SIGNIFICANT CHANGE UP (ref 32–36)
MCV RBC AUTO: 97.8 FL — SIGNIFICANT CHANGE UP (ref 80–100)
NRBC # BLD: 0 /100 WBCS — SIGNIFICANT CHANGE UP (ref 0–0)
PHOSPHATE SERPL-MCNC: 2.5 MG/DL — SIGNIFICANT CHANGE UP (ref 2.5–4.5)
PLATELET # BLD AUTO: 197 K/UL — SIGNIFICANT CHANGE UP (ref 150–400)
POTASSIUM SERPL-MCNC: 3.8 MMOL/L — SIGNIFICANT CHANGE UP (ref 3.5–5.3)
POTASSIUM SERPL-SCNC: 3.8 MMOL/L — SIGNIFICANT CHANGE UP (ref 3.5–5.3)
RBC # BLD: 3.19 M/UL — LOW (ref 3.8–5.2)
RBC # FLD: 13.3 % — SIGNIFICANT CHANGE UP (ref 10.3–14.5)
SODIUM SERPL-SCNC: 139 MMOL/L — SIGNIFICANT CHANGE UP (ref 135–145)
WBC # BLD: 10.09 K/UL — SIGNIFICANT CHANGE UP (ref 3.8–10.5)
WBC # FLD AUTO: 10.09 K/UL — SIGNIFICANT CHANGE UP (ref 3.8–10.5)

## 2022-12-11 RX ORDER — POTASSIUM CHLORIDE 20 MEQ
20 PACKET (EA) ORAL ONCE
Refills: 0 | Status: COMPLETED | OUTPATIENT
Start: 2022-12-11 | End: 2022-12-11

## 2022-12-11 RX ORDER — PANTOPRAZOLE SODIUM 20 MG/1
40 TABLET, DELAYED RELEASE ORAL
Refills: 0 | Status: DISCONTINUED | OUTPATIENT
Start: 2022-12-11 | End: 2022-12-12

## 2022-12-11 RX ADMIN — Medication 20 MILLIEQUIVALENT(S): at 13:29

## 2022-12-11 RX ADMIN — PANTOPRAZOLE SODIUM 40 MILLIGRAM(S): 20 TABLET, DELAYED RELEASE ORAL at 18:20

## 2022-12-11 RX ADMIN — HEPARIN SODIUM 5000 UNIT(S): 5000 INJECTION INTRAVENOUS; SUBCUTANEOUS at 01:04

## 2022-12-11 RX ADMIN — ONDANSETRON 4 MILLIGRAM(S): 8 TABLET, FILM COATED ORAL at 06:09

## 2022-12-11 RX ADMIN — Medication 1000 MILLIGRAM(S): at 06:00

## 2022-12-11 RX ADMIN — Medication 60 MILLIGRAM(S): at 05:39

## 2022-12-11 RX ADMIN — Medication 5 MILLIGRAM(S): at 21:29

## 2022-12-11 RX ADMIN — HEPARIN SODIUM 5000 UNIT(S): 5000 INJECTION INTRAVENOUS; SUBCUTANEOUS at 17:53

## 2022-12-11 RX ADMIN — HEPARIN SODIUM 5000 UNIT(S): 5000 INJECTION INTRAVENOUS; SUBCUTANEOUS at 09:29

## 2022-12-11 NOTE — PROGRESS NOTE ADULT - SUBJECTIVE AND OBJECTIVE BOX
STATUS POST:  Reversal of colostomy or ileostomy        POST OPERATIVE DAY #: 2    SUBJECTIVE: Pt seen and examined by chief resident. Pt is doing well, resting comfortably on bed. Pain controlled. Diet tolerated. Ambulating out of bed. +F/+BM. No nausea or vomiting. No complaints at this time.    Vital Signs Last 24 Hrs  T(C): 37.2 (11 Dec 2022 05:00), Max: 37.2 (11 Dec 2022 05:00)  T(F): 98.9 (11 Dec 2022 05:00), Max: 98.9 (11 Dec 2022 05:00)  HR: 79 (11 Dec 2022 05:00) (79 - 98)  BP: 134/89 (11 Dec 2022 05:00) (134/76 - 153/93)  BP(mean): 104 (11 Dec 2022 05:00) (97 - 104)  RR: 17 (11 Dec 2022 05:00) (17 - 17)  SpO2: 94% (11 Dec 2022 05:00) (94% - 99%)    Parameters below as of 11 Dec 2022 05:00  Patient On (Oxygen Delivery Method): room air        I&O's Summary    10 Dec 2022 07:01  -  11 Dec 2022 07:00  --------------------------------------------------------  IN: 1420 mL / OUT: 830 mL / NET: 590 mL        Physical Exam:  General Appearance: Appears well, NAD  Pulmonary: Nonlabored breathing, no respiratory distress  Abdomen: Soft, nondisteded, prior ostomy site clean and dry and intact, site repacked with wet to dry, healing well  Extremities: WWP, SCD's in place     LABS:                        11.0   14.62 )-----------( 220      ( 10 Dec 2022 05:30 )             33.0     12-10    135  |  98  |  20  ----------------------------<  125<H>  4.2   |  25  |  1.42<H>    Ca    9.2      10 Dec 2022 05:30  Phos  3.4     12-10  Mg     1.9     12-10

## 2022-12-12 ENCOUNTER — TRANSCRIPTION ENCOUNTER (OUTPATIENT)
Age: 47
End: 2022-12-12

## 2022-12-12 VITALS
RESPIRATION RATE: 17 BRPM | SYSTOLIC BLOOD PRESSURE: 118 MMHG | DIASTOLIC BLOOD PRESSURE: 65 MMHG | HEART RATE: 98 BPM | OXYGEN SATURATION: 98 % | TEMPERATURE: 98 F

## 2022-12-12 LAB
ANION GAP SERPL CALC-SCNC: 12 MMOL/L — SIGNIFICANT CHANGE UP (ref 5–17)
BUN SERPL-MCNC: 12 MG/DL — SIGNIFICANT CHANGE UP (ref 7–23)
CALCIUM SERPL-MCNC: 9 MG/DL — SIGNIFICANT CHANGE UP (ref 8.4–10.5)
CHLORIDE SERPL-SCNC: 98 MMOL/L — SIGNIFICANT CHANGE UP (ref 96–108)
CO2 SERPL-SCNC: 25 MMOL/L — SIGNIFICANT CHANGE UP (ref 22–31)
CREAT SERPL-MCNC: 0.93 MG/DL — SIGNIFICANT CHANGE UP (ref 0.5–1.3)
EGFR: 76 ML/MIN/1.73M2 — SIGNIFICANT CHANGE UP
GLUCOSE SERPL-MCNC: 90 MG/DL — SIGNIFICANT CHANGE UP (ref 70–99)
HCT VFR BLD CALC: 30 % — LOW (ref 34.5–45)
HGB BLD-MCNC: 9.8 G/DL — LOW (ref 11.5–15.5)
MAGNESIUM SERPL-MCNC: 2 MG/DL — SIGNIFICANT CHANGE UP (ref 1.6–2.6)
MCHC RBC-ENTMCNC: 31.5 PG — SIGNIFICANT CHANGE UP (ref 27–34)
MCHC RBC-ENTMCNC: 32.7 GM/DL — SIGNIFICANT CHANGE UP (ref 32–36)
MCV RBC AUTO: 96.5 FL — SIGNIFICANT CHANGE UP (ref 80–100)
NRBC # BLD: 0 /100 WBCS — SIGNIFICANT CHANGE UP (ref 0–0)
PHOSPHATE SERPL-MCNC: 2.5 MG/DL — SIGNIFICANT CHANGE UP (ref 2.5–4.5)
PLATELET # BLD AUTO: 209 K/UL — SIGNIFICANT CHANGE UP (ref 150–400)
POTASSIUM SERPL-MCNC: 3.6 MMOL/L — SIGNIFICANT CHANGE UP (ref 3.5–5.3)
POTASSIUM SERPL-SCNC: 3.6 MMOL/L — SIGNIFICANT CHANGE UP (ref 3.5–5.3)
RBC # BLD: 3.11 M/UL — LOW (ref 3.8–5.2)
RBC # FLD: 13.3 % — SIGNIFICANT CHANGE UP (ref 10.3–14.5)
SODIUM SERPL-SCNC: 135 MMOL/L — SIGNIFICANT CHANGE UP (ref 135–145)
WBC # BLD: 8.33 K/UL — SIGNIFICANT CHANGE UP (ref 3.8–10.5)
WBC # FLD AUTO: 8.33 K/UL — SIGNIFICANT CHANGE UP (ref 3.8–10.5)

## 2022-12-12 PROCEDURE — 84100 ASSAY OF PHOSPHORUS: CPT

## 2022-12-12 PROCEDURE — 86900 BLOOD TYPING SEROLOGIC ABO: CPT

## 2022-12-12 PROCEDURE — 80048 BASIC METABOLIC PNL TOTAL CA: CPT

## 2022-12-12 PROCEDURE — 85027 COMPLETE CBC AUTOMATED: CPT

## 2022-12-12 PROCEDURE — 88304 TISSUE EXAM BY PATHOLOGIST: CPT

## 2022-12-12 PROCEDURE — 94640 AIRWAY INHALATION TREATMENT: CPT

## 2022-12-12 PROCEDURE — 86901 BLOOD TYPING SEROLOGIC RH(D): CPT

## 2022-12-12 PROCEDURE — 86850 RBC ANTIBODY SCREEN: CPT

## 2022-12-12 PROCEDURE — 83735 ASSAY OF MAGNESIUM: CPT

## 2022-12-12 PROCEDURE — 36415 COLL VENOUS BLD VENIPUNCTURE: CPT

## 2022-12-12 PROCEDURE — 82962 GLUCOSE BLOOD TEST: CPT

## 2022-12-12 RX ORDER — DOCUSATE SODIUM 100 MG
1 CAPSULE ORAL
Qty: 14 | Refills: 0
Start: 2022-12-12 | End: 2022-12-18

## 2022-12-12 RX ORDER — OXYCODONE AND ACETAMINOPHEN 5; 325 MG/1; MG/1
1 TABLET ORAL
Qty: 16 | Refills: 0
Start: 2022-12-12 | End: 2022-12-15

## 2022-12-12 RX ORDER — HYDROCHLOROTHIAZIDE 25 MG
1 TABLET ORAL
Qty: 0 | Refills: 0 | DISCHARGE

## 2022-12-12 RX ORDER — HYDROCHLOROTHIAZIDE 25 MG
1 TABLET ORAL
Qty: 0 | Refills: 0 | DISCHARGE
Start: 2022-12-12

## 2022-12-12 RX ORDER — ONDANSETRON 8 MG/1
4 TABLET, FILM COATED ORAL ONCE
Refills: 0 | Status: COMPLETED | OUTPATIENT
Start: 2022-12-12 | End: 2022-12-12

## 2022-12-12 RX ORDER — POTASSIUM CHLORIDE 20 MEQ
40 PACKET (EA) ORAL ONCE
Refills: 0 | Status: COMPLETED | OUTPATIENT
Start: 2022-12-12 | End: 2022-12-12

## 2022-12-12 RX ORDER — SODIUM,POTASSIUM PHOSPHATES 278-250MG
1 POWDER IN PACKET (EA) ORAL ONCE
Refills: 0 | Status: COMPLETED | OUTPATIENT
Start: 2022-12-12 | End: 2022-12-12

## 2022-12-12 RX ADMIN — Medication 40 MILLIEQUIVALENT(S): at 08:37

## 2022-12-12 RX ADMIN — ONDANSETRON 4 MILLIGRAM(S): 8 TABLET, FILM COATED ORAL at 14:30

## 2022-12-12 RX ADMIN — PANTOPRAZOLE SODIUM 40 MILLIGRAM(S): 20 TABLET, DELAYED RELEASE ORAL at 07:04

## 2022-12-12 RX ADMIN — Medication 1 PACKET(S): at 08:37

## 2022-12-12 RX ADMIN — Medication 60 MILLIGRAM(S): at 06:00

## 2022-12-12 RX ADMIN — OXYCODONE HYDROCHLORIDE 5 MILLIGRAM(S): 5 TABLET ORAL at 11:37

## 2022-12-12 RX ADMIN — HEPARIN SODIUM 5000 UNIT(S): 5000 INJECTION INTRAVENOUS; SUBCUTANEOUS at 00:06

## 2022-12-12 NOTE — DISCHARGE NOTE PROVIDER - NSDCFUADDINST_GEN_ALL_CORE_FT
Please follow up with Dr. Morrissey next week. Call his office to schedule an appointment. Call the office if you experience increasing pain, nausea, vomiting, swelling, redness, or leakage from stoma site, temperature >101.4F.      Pain control:  Please take 2 tabs of extra strength Tylenol every 6 hours as needed for pain. DO NOT exceed 4000 mg per day. You have been prescribed percocet for pain not controlled by Tylenol. Take 1 tab every 6 hours as needed for severe pain INSTEAD of tylenol. Please do not exceed 4 tabs per day. Take prescribed stool softener (colace) to prevent constipation caused by percocet.    The visiting nurse will change your dressings daily as followed: remove previous dressing, moisten a 4x4 gauze with saline, squeeze out the gauze prior to application, cover the wound with moist gauze without covering the skin, cover the wound and surrounding skin with dry 4x4 gauze, and secure with paper tape.    General Discharge Instructions:  Please resume all regular home medications unless specifically advised not to take a particular medication. Also, please take any new medications as prescribed.  Please get plenty of rest, continue to ambulate several times per day, and drink adequate amounts of fluids. Avoid lifting weights greater than 5-10 lbs until you follow-up with your surgeon, who will instruct you further regarding activity restrictions.  Avoid driving or operating heavy machinery while taking pain medications.  Please follow-up with your surgeon and Primary Care Provider (PCP) as advised.  Incision Care:  *Please call your doctor or nurse practitioner if you have increased pain, swelling, redness, or drainage from the incision site.  *Avoid swimming and baths until your follow-up appointment.  *You may shower, and wash surgical incisions with a mild soap and warm water. Gently pat the area dry.  *If you have staples, they will be removed at your follow-up appointment.  *If you have steri-strips, they will fall off on their own. Please remove any remaining strips 7-10 days after surgery.   Please follow up with Dr. Morrissey next week. Call his office to schedule an appointment. Call the office if you experience increasing pain, nausea, vomiting, swelling, redness, or leakage from stoma site, temperature >101.4F.      Pain control:  Please take 2 tabs of extra strength Tylenol every 6 hours as needed for pain. DO NOT exceed 4000 mg per day. You have been prescribed percocet for pain not controlled by Tylenol. Take 1 tab every 6 hours as needed for severe pain INSTEAD of tylenol. Please do not exceed 4 tabs per day. Take prescribed stool softener (colace) to prevent constipation caused by percocet.    Change your dressing daily as follows: remove previous dressing, moisten a 4x4 gauze with saline, squeeze out the gauze prior to application, cover the wound with moist gauze without covering the skin, cover the wound and surrounding skin with dry 4x4 gauze, and secure with paper tape.    General Discharge Instructions:  Please resume all regular home medications unless specifically advised not to take a particular medication. Also, please take any new medications as prescribed.  Please get plenty of rest, continue to ambulate several times per day, and drink adequate amounts of fluids. Avoid lifting weights greater than 5-10 lbs until you follow-up with your surgeon, who will instruct you further regarding activity restrictions.  Avoid driving or operating heavy machinery while taking pain medications.  Please follow-up with your surgeon and Primary Care Provider (PCP) as advised.  Incision Care:  *Please call your doctor or nurse practitioner if you have increased pain, swelling, redness, or drainage from the incision site.  *Avoid swimming and baths until your follow-up appointment.  *You may shower, and wash surgical incisions with a mild soap and warm water. Gently pat the area dry.  *If you have staples, they will be removed at your follow-up appointment.  *If you have steri-strips, they will fall off on their own. Please remove any remaining strips 7-10 days after surgery.

## 2022-12-12 NOTE — DISCHARGE NOTE PROVIDER - NSDCMRMEDTOKEN_GEN_ALL_CORE_FT
albuterol 90 mcg/inh inhalation aerosol: 2 puff(s) inhaled every 6 hours, As Needed  Colace 100 mg oral capsule: 1 cap(s) orally 2 times a day, As Needed -for constipation  -for constipation   NexIUM 20 mg oral delayed release capsule: 1 cap(s) orally once a day  NIFEdipine 60 mg oral tablet, extended release: 1 tab(s) orally once a day  oxycodone-acetaminophen 5 mg-325 mg oral tablet: 1 tab(s) orally every 6 hours, As Needed -for severe pain MDD:4  ZyrTEC 10 mg oral tablet: 1 tab(s) orally once a day   albuterol 90 mcg/inh inhalation aerosol: 2 puff(s) inhaled every 6 hours, As Needed  Colace 100 mg oral capsule: 1 cap(s) orally 2 times a day, As Needed -for constipation  -for constipation   hydroCHLOROthiazide 25 mg oral tablet: 1 tab(s) orally once a day  NexIUM 20 mg oral delayed release capsule: 1 cap(s) orally once a day  NIFEdipine 60 mg oral tablet, extended release: 1 tab(s) orally once a day  oxycodone-acetaminophen 5 mg-325 mg oral tablet: 1 tab(s) orally every 6 hours, As Needed -for severe pain MDD:4  ZyrTEC 10 mg oral tablet: 1 tab(s) orally once a day

## 2022-12-12 NOTE — DISCHARGE NOTE PROVIDER - NSDCCPCAREPLAN_GEN_ALL_CORE_FT
PRINCIPAL DISCHARGE DIAGNOSIS  Diagnosis: Status post reversal of ileostomy  Assessment and Plan of Treatment: Follow up with Dr. Morrissey in 1-2 weeks. Call the office at the number below to schedule your appointment. You may shower; soap and water over incision sites. Do not scrub. Pat dry when done. No tub bathing or swimming until cleared. Keep incision sites out of the sun as scars will darken. Ambulate as tolerated, but no heavy lifting (>10lbs) or strenuous exercise. You should be on low fiber diet. You should be urinating at least 3-4x per day. Call the office if you experience increasing abdominal pain, nausea, vomiting, or temperature >101 F.

## 2022-12-12 NOTE — DISCHARGE NOTE PROVIDER - CARE PROVIDER_API CALL
Viki Morrissey)  Surgery  [facility], [address]  [place] [address]  Phone: [phone]  Fax: [phone]  Follow Up Time:

## 2022-12-12 NOTE — DISCHARGE NOTE NURSING/CASE MANAGEMENT/SOCIAL WORK - PATIENT PORTAL LINK FT
You can access the FollowMyHealth Patient Portal offered by [facility] by registering at the following website: [email or web address]. By joining [employer]’s FollowMyHealth portal, you will also be able to view your health information using other applications (apps) compatible with our system.

## 2022-12-12 NOTE — PROGRESS NOTE ADULT - SUBJECTIVE AND OBJECTIVE BOX
INTERVAL HPI/OVERNIGHT EVENTS: +f/+bm, [place] LRD , paon controlled , pt not urinating in  hat     STATUS POST: 12/9: Colostomy reversal    SUBJECTIVE: Patient seen this morning by the surgery team. She was sitting up in bed comfortably.  She is tolerating her diet, her pain is well controlled and she has been OOBA. +BM/+F. Patient feels that she is able to change her dressings on her own once she goes home. Denies cp, SOB, nausea, emesis, fevers.     MEDICATIONS  (STANDING):  heparin   Injectable 5000 Unit(s) SubCutaneous every 8 hours  hydrochlorothiazide 25 milliGRAM(s) Oral daily  influenza   Vaccine 0.5 milliLiter(s) IntraMuscular once  lactated ringers. 1000 milliLiter(s) (40 mL/Hr) IV Continuous <Continuous>  melatonin 5 milliGRAM(s) Oral at bedtime  NIFEdipine XL 60 milliGRAM(s) Oral daily  pantoprazole    Tablet 40 milliGRAM(s) Oral before breakfast    MEDICATIONS  (PRN):  acetaminophen     Tablet .. 650 milliGRAM(s) Oral every 6 hours PRN Mild Pain (1 - 3)  albuterol    90 MICROgram(s) HFA Inhaler 2 Puff(s) Inhalation every 6 hours PRN Shortness of Breath and/or Wheezing  ondansetron Injectable 4 milliGRAM(s) IV Push every 6 hours PRN Nausea and/or Vomiting  oxyCODONE    IR 2.5 milliGRAM(s) Oral every 4 hours PRN Moderate Pain (4 - 6)  oxyCODONE    IR 5 milliGRAM(s) Oral every 4 hours PRN Severe Pain (7 - 10)      Vital Signs Last 24 Hrs  T(C): 36.9 (12 Dec 2022 08:46), Max: 37.2 (11 Dec 2022 17:14)  T(F): 98.5 (12 Dec 2022 08:46), Max: 98.9 (11 Dec 2022 17:14)  HR: 82 (12 Dec 2022 08:46) (74 - 96)  BP: 126/73 (12 Dec 2022 08:46) (122/72 - 143/83)  BP(mean): 93 (12 Dec 2022 05:04) (93 - 93)  RR: 17 (12 Dec 2022 08:46) (16 - 17)  SpO2: 99% (12 Dec 2022 08:46) (96% - 99%)    Parameters below as of 12 Dec 2022 08:46  Patient On (Oxygen Delivery Method): room air        PHYSICAL EXAM:  Constitutional: A&Ox3    Respiratory: non labored breathing, no respiratory distress    Cardiovascular: NSR, RRR    Gastrointestinal: Abdomen is soft, NTND.                  Incision: Wound healing well. Clean with minimal serous fluid    Genitourinary: voiding    Extremities: wwp, legs are without edema and are non-tender bilaterally                   I&O's Detail    11 Dec 2022 07:01  -  12 Dec 2022 07:00  --------------------------------------------------------  IN:    Oral Fluid: 540 mL  Total IN: 540 mL    OUT:    Voided (mL): 500 mL  Total OUT: 500 mL    Total NET: 40 mL          LABS:                        9.8    8.33  )-----------( 209      ( 12 Dec 2022 06:39 )             30.0     12-12    135  |  98  |  12  ----------------------------<  90  3.6   |  25  |  0.93    Ca    9.0      12 Dec 2022 06:39  Phos  2.5     12-12  Mg     2.0     12-12            RADIOLOGY & ADDITIONAL STUDIES:

## 2022-12-12 NOTE — DISCHARGE NOTE NURSING/CASE MANAGEMENT/SOCIAL WORK - NSDCPEFALRISK_GEN_ALL_CORE
For information on Fall & Injury Prevention, visit: [email or web address] OR  [email or web address] OR  https://www.cdc.gov/steadi/patient.html

## 2022-12-12 NOTE — DISCHARGE NOTE PROVIDER - HOSPITAL COURSE
This is a 46yo lady s/p Colostomy for Crohns perianal disease. Underwent biological Rx by GI. She has completed her treatment and now presented for reversal. On 12/9, pt underwent Colostomy reversal. Procedure was uncomplicated. Post op course was uneventful. Pt tolerated PO intake with return of bowel function, voided adequately and remained hemodynamically stable. At time of discharge pt was stable.

## 2022-12-12 NOTE — PROGRESS NOTE ADULT - ASSESSMENT
46yo s/p Colostomy (2021) or crohns perianal disease. Had biological treatment. EUA 2022 no active perianal crohns disease. Pt is now s/p colostomy reversal on 12/9.    Admit to regional   Pain/nausea control prn  CLD/IVF  HSQ/SCDs   Betadine packing change on POD1 (12/10)  TOV pending   OOBA/IS    
48yo s/p Colostomy (2021) or crohns perianal disease. Had biological treatment. EUA 2022 no active perianal crohns disease. Pt is now s/p colostomy reversal on 12/9.    LRD  HSQ/SCDs   WTD dressing changes   OOBA/IS  pain/nausea control  Albuterol  Dispo: d/c today, no needs   
48yo s/p Colostomy (2021) or crohns perianal disease. Had biological treatment. EUA 2022 no active perianal crohns disease. Pt is now s/p colostomy reversal on 12/9.    Pain/nausea control prn  CLD w/ ensures/IVF; LRD once return of bowel function   HSQ/SCDs   WTD dressing changes   OOBA/IS  Albuterol   
46yo s/p Colostomy (2021) or crohns perianal disease. Had biological treatment. EUA 2022 no active perianal crohns disease. Pt is now s/p colostomy reversal on 12/9.    Pain/nausea control prn  CLD w/ ensures/IVF; LRD once return of bowel function   HSQ/SCDs   Betadine packing change on POD1 (12/10)  OOBA/IS  Albuterol

## 2022-12-14 ENCOUNTER — NON-APPOINTMENT (OUTPATIENT)
Age: 47
End: 2022-12-14

## 2022-12-14 PROBLEM — R11.0 NAUSEA: Status: ACTIVE | Noted: 2022-12-14

## 2022-12-14 PROBLEM — U07.1 COVID-19: Chronic | Status: ACTIVE | Noted: 2022-08-23

## 2022-12-14 PROBLEM — K60.3 ANAL FISTULA: Chronic | Status: ACTIVE | Noted: 2022-12-08

## 2022-12-20 LAB — SURGICAL PATHOLOGY STUDY: SIGNIFICANT CHANGE UP

## 2022-12-23 ENCOUNTER — APPOINTMENT (OUTPATIENT)
Dept: COLORECTAL SURGERY | Facility: CLINIC | Age: 47
End: 2022-12-23

## 2022-12-23 VITALS
BODY MASS INDEX: 34.25 KG/M2 | OXYGEN SATURATION: 98 % | DIASTOLIC BLOOD PRESSURE: 97 MMHG | HEART RATE: 81 BPM | WEIGHT: 226 LBS | SYSTOLIC BLOOD PRESSURE: 156 MMHG | TEMPERATURE: 98.2 F | HEIGHT: 68 IN

## 2022-12-23 PROCEDURE — 99024 POSTOP FOLLOW-UP VISIT: CPT

## 2022-12-23 RX ORDER — ASCORBIC ACID 500 MG
TABLET ORAL
Refills: 0 | Status: COMPLETED | COMMUNITY
End: 2022-12-23

## 2022-12-23 RX ORDER — METRONIDAZOLE 500 MG/1
500 TABLET ORAL
Qty: 3 | Refills: 0 | Status: COMPLETED | COMMUNITY
Start: 2022-11-28 | End: 2022-12-23

## 2022-12-23 RX ORDER — ERYTHROMYCIN 500 MG/1
500 TABLET, FILM COATED ORAL
Qty: 6 | Refills: 0 | Status: COMPLETED | COMMUNITY
Start: 2022-11-28 | End: 2022-12-23

## 2022-12-23 NOTE — HISTORY OF PRESENT ILLNESS
[FreeTextEntry1] : 47 year old female s/p colostomy reversal on 12/9/22\par \par Here for her post- op visit\par \par Overall feeling ok but just tired.\par Appetite is ok \par Nausea is less\par Denies vomiting\par Moving her bowels, stools are soft and regular\par Denies pain just soreness\par Denies bleeding

## 2022-12-23 NOTE — PHYSICAL EXAM
[Abdomen Masses] : No abdominal masses [Abdomen Tenderness] : ~T No ~M abdominal tenderness [Exam Deferred] : exam was deferred [No Rash or Lesion] : No rash or lesion [Alert] : alert [Oriented to Person] : oriented to person [Oriented to Place] : oriented to place [Oriented to Time] : oriented to time [Calm] : calm [de-identified] : soft; surgical wound - clean and healing well; we changed the dressing [de-identified] : Normal [de-identified] : Normal [de-identified] : Normal [de-identified] : Normal [de-identified] : Normal

## 2022-12-23 NOTE — ASSESSMENT
[FreeTextEntry1] : 47 F here for post-op visit. She is doing well following the reversal of her colostomy. No anal symptoms. On exam, the wound is healing well.\par Plan:\par Continue with daily dressings.\par See again after 6 months (sooner of required).\par Continue follow up with Dr Plata and the infusional treatment for Crohn's.\par All questions answered.

## 2023-02-23 ENCOUNTER — RX RENEWAL (OUTPATIENT)
Age: 48
End: 2023-02-23

## 2023-06-23 ENCOUNTER — APPOINTMENT (OUTPATIENT)
Dept: COLORECTAL SURGERY | Facility: CLINIC | Age: 48
End: 2023-06-23
Payer: COMMERCIAL

## 2023-06-23 VITALS
OXYGEN SATURATION: 99 % | HEART RATE: 75 BPM | BODY MASS INDEX: 32.74 KG/M2 | WEIGHT: 216 LBS | TEMPERATURE: 98 F | SYSTOLIC BLOOD PRESSURE: 161 MMHG | DIASTOLIC BLOOD PRESSURE: 96 MMHG | RESPIRATION RATE: 12 BRPM | HEIGHT: 68 IN

## 2023-06-23 PROCEDURE — 99213 OFFICE O/P EST LOW 20 MIN: CPT

## 2023-06-23 NOTE — HISTORY OF PRESENT ILLNESS
[FreeTextEntry1] : 48 year old female pt s/p colostomy reversal 12/9/22\par History of Crohn's anal fistula abscess and diverting colostomy, \par Last seen 12/23/22\par \par Here for follow up\par \par BP noted to be high however pt hasn't taken her medication yet today\par \par Pt feeling fine\par Denies fevers, chills, occasional nausea but no vomiting\par Takes Zofran as needed\par Tolerating a regular diet\par Having BM once a day to every 2 days\par Occasional blood spotting on the skin, that's the only concern she has. She wants to make sure it's ok.\par \par

## 2023-06-23 NOTE — ASSESSMENT
[FreeTextEntry1] : 48 F here for follow up. She is doing well and is following with Dr Plata at [facility] for her Crohn's. On exam, no perianal abscess or anal fistula; the perianal skin is thin and fragile.\par Plan:\par Continue with treatment and follow up with Dr Plata.\par See again as needed.\par All questions answered.

## 2023-06-23 NOTE — PHYSICAL EXAM
[Abdomen Masses] : No abdominal masses [Abdomen Tenderness] : ~T No ~M abdominal tenderness [No HSM] : no hepatosplenomegaly [No Rash or Lesion] : No rash or lesion [Alert] : alert [Oriented to Person] : oriented to person [Oriented to Place] : oriented to place [Oriented to Time] : oriented to time [Calm] : calm [de-identified] : Soft; scar of previous surgery [de-identified] : External - normal apart from scarring from previous surgeries; perianal skin is thin and fragile; no abscess or fistula [de-identified] : Normal [de-identified] : Normal [de-identified] : Normal [de-identified] : Normal [de-identified] : Normal

## 2023-08-07 ENCOUNTER — EMERGENCY (EMERGENCY)
Facility: HOSPITAL | Age: 48
LOS: 1 days | Discharge: HOME | End: 2023-08-07
Attending: STUDENT IN AN ORGANIZED HEALTH CARE EDUCATION/TRAINING PROGRAM | Admitting: STUDENT IN AN ORGANIZED HEALTH CARE EDUCATION/TRAINING PROGRAM
Payer: COMMERCIAL

## 2023-08-07 VITALS
HEART RATE: 83 BPM | DIASTOLIC BLOOD PRESSURE: 104 MMHG | SYSTOLIC BLOOD PRESSURE: 155 MMHG | RESPIRATION RATE: 18 BRPM | OXYGEN SATURATION: 98 % | TEMPERATURE: 98 F

## 2023-08-07 DIAGNOSIS — Z98.890 OTHER SPECIFIED POSTPROCEDURAL STATES: Chronic | ICD-10-CM

## 2023-08-07 DIAGNOSIS — R11.0 NAUSEA: ICD-10-CM

## 2023-08-07 DIAGNOSIS — Z87.42 PERSONAL HISTORY OF OTHER DISEASES OF THE FEMALE GENITAL TRACT: ICD-10-CM

## 2023-08-07 DIAGNOSIS — Z90.49 ACQUIRED ABSENCE OF OTHER SPECIFIED PARTS OF DIGESTIVE TRACT: ICD-10-CM

## 2023-08-07 DIAGNOSIS — Z86.16 PERSONAL HISTORY OF COVID-19: ICD-10-CM

## 2023-08-07 DIAGNOSIS — Z87.2 PERSONAL HISTORY OF DISEASES OF THE SKIN AND SUBCUTANEOUS TISSUE: ICD-10-CM

## 2023-08-07 DIAGNOSIS — I10 ESSENTIAL (PRIMARY) HYPERTENSION: ICD-10-CM

## 2023-08-07 DIAGNOSIS — Z88.6 ALLERGY STATUS TO ANALGESIC AGENT: ICD-10-CM

## 2023-08-07 DIAGNOSIS — Z86.018 PERSONAL HISTORY OF OTHER BENIGN NEOPLASM: ICD-10-CM

## 2023-08-07 DIAGNOSIS — Z86.2 PERSONAL HISTORY OF DISEASES OF THE BLOOD AND BLOOD-FORMING ORGANS AND CERTAIN DISORDERS INVOLVING THE IMMUNE MECHANISM: ICD-10-CM

## 2023-08-07 DIAGNOSIS — Z80.0 FAMILY HISTORY OF MALIGNANT NEOPLASM OF DIGESTIVE ORGANS: ICD-10-CM

## 2023-08-07 DIAGNOSIS — K62.89 OTHER SPECIFIED DISEASES OF ANUS AND RECTUM: ICD-10-CM

## 2023-08-07 DIAGNOSIS — Z87.19 PERSONAL HISTORY OF OTHER DISEASES OF THE DIGESTIVE SYSTEM: ICD-10-CM

## 2023-08-07 DIAGNOSIS — Z93.3 COLOSTOMY STATUS: Chronic | ICD-10-CM

## 2023-08-07 DIAGNOSIS — J45.909 UNSPECIFIED ASTHMA, UNCOMPLICATED: ICD-10-CM

## 2023-08-07 DIAGNOSIS — Z90.49 ACQUIRED ABSENCE OF OTHER SPECIFIED PARTS OF DIGESTIVE TRACT: Chronic | ICD-10-CM

## 2023-08-07 DIAGNOSIS — K61.1 RECTAL ABSCESS: ICD-10-CM

## 2023-08-07 DIAGNOSIS — K50.90 CROHN'S DISEASE, UNSPECIFIED, WITHOUT COMPLICATIONS: ICD-10-CM

## 2023-08-07 LAB
ANION GAP SERPL CALC-SCNC: 9 MMOL/L — SIGNIFICANT CHANGE UP (ref 5–17)
APPEARANCE UR: CLEAR — SIGNIFICANT CHANGE UP
BASOPHILS # BLD AUTO: 0.04 K/UL — SIGNIFICANT CHANGE UP (ref 0–0.2)
BASOPHILS NFR BLD AUTO: 0.6 % — SIGNIFICANT CHANGE UP (ref 0–2)
BILIRUB UR-MCNC: NEGATIVE — SIGNIFICANT CHANGE UP
BUN SERPL-MCNC: 11 MG/DL — SIGNIFICANT CHANGE UP (ref 7–23)
CALCIUM SERPL-MCNC: 9.7 MG/DL — SIGNIFICANT CHANGE UP (ref 8.4–10.5)
CHLORIDE SERPL-SCNC: 100 MMOL/L — SIGNIFICANT CHANGE UP (ref 96–108)
CO2 SERPL-SCNC: 28 MMOL/L — SIGNIFICANT CHANGE UP (ref 22–31)
COLOR SPEC: YELLOW — SIGNIFICANT CHANGE UP
CREAT SERPL-MCNC: 1.09 MG/DL — SIGNIFICANT CHANGE UP (ref 0.5–1.3)
DIFF PNL FLD: NEGATIVE — SIGNIFICANT CHANGE UP
EGFR: 62 ML/MIN/1.73M2 — SIGNIFICANT CHANGE UP
EOSINOPHIL # BLD AUTO: 0.16 K/UL — SIGNIFICANT CHANGE UP (ref 0–0.5)
EOSINOPHIL NFR BLD AUTO: 2.3 % — SIGNIFICANT CHANGE UP (ref 0–6)
GLUCOSE SERPL-MCNC: 89 MG/DL — SIGNIFICANT CHANGE UP (ref 70–99)
GLUCOSE UR QL: NEGATIVE — SIGNIFICANT CHANGE UP
HCT VFR BLD CALC: 40.3 % — SIGNIFICANT CHANGE UP (ref 34.5–45)
HGB BLD-MCNC: 13.6 G/DL — SIGNIFICANT CHANGE UP (ref 11.5–15.5)
IMM GRANULOCYTES NFR BLD AUTO: 0.1 % — SIGNIFICANT CHANGE UP (ref 0–0.9)
KETONES UR-MCNC: NEGATIVE — SIGNIFICANT CHANGE UP
LEUKOCYTE ESTERASE UR-ACNC: NEGATIVE — SIGNIFICANT CHANGE UP
LYMPHOCYTES # BLD AUTO: 1.4 K/UL — SIGNIFICANT CHANGE UP (ref 1–3.3)
LYMPHOCYTES # BLD AUTO: 20.2 % — SIGNIFICANT CHANGE UP (ref 13–44)
MCHC RBC-ENTMCNC: 31.8 PG — SIGNIFICANT CHANGE UP (ref 27–34)
MCHC RBC-ENTMCNC: 33.7 GM/DL — SIGNIFICANT CHANGE UP (ref 32–36)
MCV RBC AUTO: 94.2 FL — SIGNIFICANT CHANGE UP (ref 80–100)
MONOCYTES # BLD AUTO: 0.43 K/UL — SIGNIFICANT CHANGE UP (ref 0–0.9)
MONOCYTES NFR BLD AUTO: 6.2 % — SIGNIFICANT CHANGE UP (ref 2–14)
NEUTROPHILS # BLD AUTO: 4.89 K/UL — SIGNIFICANT CHANGE UP (ref 1.8–7.4)
NEUTROPHILS NFR BLD AUTO: 70.6 % — SIGNIFICANT CHANGE UP (ref 43–77)
NITRITE UR-MCNC: NEGATIVE — SIGNIFICANT CHANGE UP
NRBC # BLD: 0 /100 WBCS — SIGNIFICANT CHANGE UP (ref 0–0)
PH UR: 6.5 — SIGNIFICANT CHANGE UP (ref 5–8)
PLATELET # BLD AUTO: 272 K/UL — SIGNIFICANT CHANGE UP (ref 150–400)
POTASSIUM SERPL-MCNC: 4.2 MMOL/L — SIGNIFICANT CHANGE UP (ref 3.5–5.3)
POTASSIUM SERPL-SCNC: 4.2 MMOL/L — SIGNIFICANT CHANGE UP (ref 3.5–5.3)
PROT UR-MCNC: NEGATIVE MG/DL — SIGNIFICANT CHANGE UP
RBC # BLD: 4.28 M/UL — SIGNIFICANT CHANGE UP (ref 3.8–5.2)
RBC # FLD: 12.8 % — SIGNIFICANT CHANGE UP (ref 10.3–14.5)
SODIUM SERPL-SCNC: 137 MMOL/L — SIGNIFICANT CHANGE UP (ref 135–145)
SP GR SPEC: 1.02 — SIGNIFICANT CHANGE UP (ref 1–1.03)
UROBILINOGEN FLD QL: 0.2 E.U./DL — SIGNIFICANT CHANGE UP
WBC # BLD: 6.93 K/UL — SIGNIFICANT CHANGE UP (ref 3.8–10.5)
WBC # FLD AUTO: 6.93 K/UL — SIGNIFICANT CHANGE UP (ref 3.8–10.5)

## 2023-08-07 PROCEDURE — 72193 CT PELVIS W/DYE: CPT | Mod: 26,MA

## 2023-08-07 PROCEDURE — 99285 EMERGENCY DEPT VISIT HI MDM: CPT

## 2023-08-07 NOTE — ED ADULT NURSE NOTE - OBJECTIVE STATEMENT
Pt is 48F hx of rectal abscess August 2020 requiring colostomy formation and colostomy reversed December 2022. Pt states is having rectal pain since yesterday afternoon that feels similar to hx of abscess. Last BM yesterday pt states was normal for self, denies blood in stool. States healing since reversal of colostomy has been uncomplicated.

## 2023-08-07 NOTE — ED ADULT TRIAGE NOTE - CHIEF COMPLAINT QUOTE
Pt states "I have rectal pain since yesterday." Pt endorse hx of rectal abscess with colenostomy and reversal. Pt states "I have rectal pain since yesterday." Pt endorse hx of rectal abscess with colostomy and reversal.

## 2023-08-07 NOTE — ED ADULT NURSE NOTE - CHIEF COMPLAINT QUOTE
Pt states "I have rectal pain since yesterday." Pt endorse hx of rectal abscess with colostomy and reversal.

## 2023-08-08 VITALS
TEMPERATURE: 98 F | SYSTOLIC BLOOD PRESSURE: 144 MMHG | OXYGEN SATURATION: 97 % | HEART RATE: 74 BPM | RESPIRATION RATE: 16 BRPM | DIASTOLIC BLOOD PRESSURE: 95 MMHG

## 2023-08-08 PROCEDURE — 36415 COLL VENOUS BLD VENIPUNCTURE: CPT

## 2023-08-08 PROCEDURE — 72193 CT PELVIS W/DYE: CPT | Mod: MA

## 2023-08-08 PROCEDURE — 80048 BASIC METABOLIC PNL TOTAL CA: CPT

## 2023-08-08 PROCEDURE — 85025 COMPLETE CBC W/AUTO DIFF WBC: CPT

## 2023-08-08 PROCEDURE — 99284 EMERGENCY DEPT VISIT MOD MDM: CPT | Mod: 25

## 2023-08-08 PROCEDURE — 81003 URINALYSIS AUTO W/O SCOPE: CPT

## 2023-08-08 RX ORDER — OXYCODONE HYDROCHLORIDE 5 MG/1
1 TABLET ORAL
Qty: 6 | Refills: 0
Start: 2023-08-08

## 2023-08-08 RX ORDER — OXYCODONE HYDROCHLORIDE 5 MG/1
5 TABLET ORAL ONCE
Refills: 0 | Status: DISCONTINUED | OUTPATIENT
Start: 2023-08-08 | End: 2023-08-08

## 2023-08-08 RX ADMIN — Medication 1 TABLET(S): at 02:23

## 2023-08-08 RX ADMIN — OXYCODONE HYDROCHLORIDE 5 MILLIGRAM(S): 5 TABLET ORAL at 01:43

## 2023-08-08 NOTE — ED PROVIDER NOTE - PHYSICAL EXAMINATION
Vitals reviewed  Gen: well appearing, nad, speaking in full sentences  Skin: wwp, no rash/lesions  HEENT: ncat, eomi, mmm  CV: rrr, no audible m/r/g  Resp: symmetrical expansion, ctab, no w/r/r  Abd: nondistended, soft/nt  JOSUÉ: limited exam, no palpable area of induration/fluctuance, no erythema or streaking noted, + ttp w/ attempted digital insertion (exam stopped)  Ext: FROM throughout, no peripheral edema  Neuro: alert/oriented, no focal deficits, steady gait

## 2023-08-08 NOTE — ED PROVIDER NOTE - PATIENT PORTAL LINK FT
You can access the FollowMyHealth Patient Portal offered by [facility] by registering at the following website: [email or web address]. By joining [facility]’s FollowMyHealth portal, you will also be able to view your health information using other applications (apps) compatible with our system.

## 2023-08-08 NOTE — CONSULT NOTE ADULT - NSCONSULTADDITIONALINFOA_GEN_ALL_CORE
****Senior Surgical Resident Addendum****    Agree w above A/P. Briefly, 49yo F, HTN, asthma, Crohns, on Humera s/p colostomy (21) w reversal (22) w multiple perirectal abscesses, presents to ED w rectal pain, afebrile, VS w/nl, labns w/nl, abd soft, NT, ND, JOSUÉ w anterior midline fissure, appropriate tone, R posterior quadrant w TTP w/o fluctuance, induration. CT A/P w R 1.8x2.2x 1.3cm collection, phlegmon vs abscess. Dc from ER w 7 days rahul, f/up appt 8/11 w Dr. Morrissey.

## 2023-08-08 NOTE — CONSULT NOTE ADULT - ASSESSMENT
48F with PMHx of Crohn's disease (dx in 2021, currently on Humera), HTN, astham and recurrent rectal abscesses (last in June 2022 requiring EUA and I&D) and PSHx of colostomy in 2021 for fecal diversion and reversal in 2022 who presents to [facility] for 2 days of rectal pain. Surgery consulted for perianal abscess on CT scan. Patient afebrile, HD stable on presentation. Labs without significant derangement. Patient tender on exam, but without significant erythema or fluctuance noted. No identifiable collection large enough to be drain at this time.     Recommendations:  - No acute surgical intervention at this time  - Patient can be discharged on Augmentin 875/125 q12 for 7 days  - Please instruct patient to follow up with Dr. Morrissey in office on Friday, 8/11, for follow up or call the office if pain worsens  - Rest of dispo per ED  - Discussed with chief on call and attending surgeon

## 2023-08-08 NOTE — CONSULT NOTE ADULT - SUBJECTIVE AND OBJECTIVE BOX
General Surgery Consult Note    HPI:  Patient is a 48F with PMHx of Crohn's disease (dx in 2021, currently on Humera), HTN, astham and recurrent rectal abscesses (last in June 2022 requiring EUA and I&D) and PSHx of colostomy in 2021 for fecal diversion and reversal in 2022 who presents to [facility] for 2 days of rectal pain. Patient reports starting Sunday she began to experiencing sharp pain in rectal area similar to prior abscesses. Denied precipitating factors. Reports pain was sharp and intermittent and associated with mild nausea/PO intolerance, but denied fevers, chills, chest pain, SOB. States she has been passing flatus and stooling without issue. Reports pain was not improving so decided to present to [facility] from evaluation.     Reports last colonoscopy was in December 2022 prior to colostomy reversal and was normal. Denies prior EGD.  Reports family history of colon cancer in mother and reports personal history of Crohn's disease.    Medical History: HTN, asthma, Crohns  Surgical History: Colostomy (2021), Colostomy reversal (2022), Numerous EUA for seton placements, I&Ds  Medications: Nifedipine 90, Humera, Nexium, Albuterol PRN, Zytrec PRN  Allergies: ASA - hives  Social History: Denies tobacco use, alcohol use, recreational drug use. 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    In the ED, patient nontoxic appearing:  -VITALS: Afebrile T 98.2 F, HR 84, /91, saturating well on RA  -LABORATORY: No significant lab derrangements- WBC 6.9K - no shift, Hb 13.6, Cr 1.04, Na 137, UA negative  -IMAGING: right subcutaneous fat posterior to anus is a 1.8 x 2.2 x 1.3 cm hypodense fluid collectionwith peripheral enhancement and surrounding fat stranding      PAST MEDICAL & SURGICAL HISTORY:  Crohn's disease      Syncope  r/t anemia      High blood pressure      Asthma      2019 novel coronavirus disease (COVID-19)  2020; 9/2022      Acid reflux      Anal fistula      Colostomy present      History of surgery  left neck tumor excision      H/O myomectomy      History of colon resection  12/2021          MEDICATIONS  (STANDING):    MEDICATIONS  (PRN):      Allergies    aspirin (Hives)    Intolerances        SOCIAL HISTORY:    FAMILY HISTORY:          Physical Exam:  General: Resting comfortably in bed, NAD  Neuro: A&Ox4, no focal deficits  CV: NSR  Pulmonary: Equal chest wall expansion b/l, no respiratory distress  Abdomen: Soft, ND, NT. Prior colostomy site in LLQ well healing  Rectal: Anterior midline fissure noted, R posterior tendernes to palpation without erythema or fluctuance  Extremeties: WWP, no significnat edema    Vital Signs Last 24 Hrs  T(C): 36.8 (07 Aug 2023 22:51), Max: 36.8 (07 Aug 2023 19:08)  T(F): 98.2 (07 Aug 2023 22:51), Max: 98.2 (07 Aug 2023 19:08)  HR: 84 (07 Aug 2023 22:51) (83 - 84)  BP: 147/91 (07 Aug 2023 22:51) (144/91 - 155/104)  BP(mean): --  RR: 18 (07 Aug 2023 22:51) (18 - 18)  SpO2: 97% (07 Aug 2023 22:51) (97% - 98%)    Parameters below as of 07 Aug 2023 22:51  Patient On (Oxygen Delivery Method): room air      I&O's Summary          LABS:                        13.6   6.93  )-----------( 272      ( 07 Aug 2023 20:07 )             40.3     08-07    137  |  100  |  11  ----------------------------<  89  4.2   |  28  |  1.09    Ca    9.7      07 Aug 2023 20:07        Urinalysis Basic - ( 07 Aug 2023 20:07 )    Color: x / Appearance: x / SG: x / pH: x  Gluc: 89 mg/dL / Ketone: x  / Bili: x / Urobili: x   Blood: x / Protein: x / Nitrite: x   Leuk Esterase: x / RBC: x / WBC x   Sq Epi: x / Non Sq Epi: x / Bacteria: x      CAPILLARY BLOOD GLUCOSE            Cultures:      RADIOLOGY & ADDITIONAL STUDIES:      INTERPRETATION:  CLINICAL INFORMATION: Rectal pain, concern for abscess.   History of Crohn's disease with anal fistula    COMPARISON: None.    CONTRAST/COMPLICATIONS:  IV Contrast: Isovue 370  90 cc administered   10 cc discarded  Oral Contrast: None  Complications: None reported at time of study completion    PROCEDURE:  CT of the Pelvis was performed.  Sagittal and coronal reformats wereperformed.    FINDINGS:  BLADDER: Within normal limits.  REPRODUCTIVE ORGANS: Possible partial bicornuate appearance of the   uterus. Few right ovarian cyst measuring up to 3.3 cm.  LYMPH NODES: No pelvic lymphadenopathy.    VISUALIZED PORTIONS:  BOWEL: No rectal wall thickening or perirectal abscess. Appendix is   normal.  PERITONEUM: No ascites.  VESSELS: Within normal limits.  SOFT TISSUES: Within the right subcutaneous fat posterior to anus is a   1.8 x 2.2 x 1.3 cm hypodense fluid collectionwith peripheral enhancement   and surrounding fat stranding. Postsurgical changes of the abdominal   wall. Small fat-containing upper ventral wall incisional hernia.  BONES: Within normal limits.      IMPRESSION:    Posterior perianal abscess measuring up to 2.2 cm.    --- End of Report ---

## 2023-08-08 NOTE — ED PROVIDER NOTE - OBJECTIVE STATEMENT
48 F pmh crohns perianal disease s/p colostomy/reversal 2022, biologic treatment, recurrent rectal abscesses p/w rectal pain x 2 days.  pt reports dull throbbing pain in rectum, worse when sitting on buttock.  feels similar to abscesses in the past.  + nausea, no vomiting.  denies f/c, HA, dizziness, cp/sob, abd pain, vd, rectal dc/bleeding, urinary sxs, trauma

## 2023-08-08 NOTE — ED PROVIDER NOTE - CLINICAL SUMMARY MEDICAL DECISION MAKING FREE TEXT BOX
48 F pmh crohns perianal disease s/p colostomy/reversal 2022, biologic treatment, recurrent rectal abscesses p/w rectal pain x 2 days.  on exam vss, afebrile, comfortable appearing, abd soft/nt, JOSUÉ: limited exam, no palpable area of induration/fluctuance, no erythema or streaking noted, + ttp w/ attempted digital insertion (exam stopped).  c/f recurrent abscess vs fistula.  will obtain labs, ct pelvis, pt declined analgesia.

## 2023-08-08 NOTE — ED PROVIDER NOTE - PROGRESS NOTE DETAILS
CT w/ findings c/w early abscess.  surg c/s surg eval pt and d/w Dr. Morrissey, no significant collection to I&D at this time.  rec dc w/ augmentin and f/u outpt on Friday.  rx sent.  pt already has appt Friday w/ surg.  discussed strict return parameters

## 2023-08-08 NOTE — ED PROVIDER NOTE - NSFOLLOWUPINSTRUCTIONS_ED_ALL_ED_FT
Please rest and remain well hydrated with plenty of fluids.  You can take motrin 600-800mg and tylenol 650mg every 3 hours, switching between the two for pain.  You can take oxycodone for severe pain     Please take full course of antibiotics as prescribed    Continue to soak in sitz bathes     Follow up with Dr. Morrissey Friday as scheduled    return if you have fever, worsening pain, vomiting or other concerns    Rectal Abscess    WHAT YOU NEED TO KNOW:    What is a rectal abscess? A rectal abscess is a pus-filled pocket that forms in your rectum.    What increases my risk for a rectal abscess?    Blocked anal glands or an infected anal fissure (tear in the lining of your rectum)    Diseases such as diabetes or Crohn disease    A puncture from items such as fish bones that are swallowed    Internal hemorrhoids, acute appendicitis, or diverticulitis    Trauma to the rectum, such as from anal sex, an enema, or treatment for an internal hemorrhoid    Surgery on your anus or rectum, or your vaginal area (women)  What are the signs and symptoms of a rectal abscess?    Pain in your rectum, especially when you sit or have a bowel movement    Swelling that is painful, hard, tender, or red    Pus that drains from the swollen area    A foul-smelling discharge from your anus    Trouble urinating, or constipation    Fever or unusual tiredness  How is a rectal abscess diagnosed? Your healthcare provider will ask about your symptoms and when they started. Tell your provider about any recent changes to your bowel movements, such as color or how often you have them. Tell your provider if you have constipation or diarrhea. Your provider may ask if you have had any nausea or vomiting when you were not sick. Also tell your provider about any medical conditions you have, such as Crohn disease or diabetes. You may need any of the following:    Blood tests may show signs of a bacterial infection or other cause of the abscess.    A manual rectal exam is done so your provider can feel for lumps or other problems. Your provider will insert a gloved finger into your rectum through your anus.    Anoscopy is a procedure used to examine your rectum. Your provider will insert an anoscope (speculum) into your rectum. A tissue sample may be taken to be tested.    CT, MRI, or ultrasound pictures may show the abscess. The pictures may also show damage to the rectum.  How is a rectal abscess treated? Treatment will depend on the type of abscess and where it is located in your rectum. You may need any of the following:    Incision and drainage is a procedure used to drain the abscess.    Medicines may be given to fight the bacterial infection or to reduce pain. You may also need medicine to soften your bowel movements to prevent straining.    Surgery may be needed to fix damage to your rectum.  What can I do to manage or prevent a rectal abscess? The abscess may take a few weeks to heal. The following can help manage symptoms and prevent another abscess:    Prevent constipation. Eat more fruits, vegetables, and whole-grain breads to increase the amount of fiber you have each day. Drink more liquids. Fiber and liquid help prevent constipation. It may also help to create a bowel movement routine. Do not strain to have a bowel movement. The strain may cause more damage. Your healthcare provider may give you or recommend medicine to soften your bowel movements if you are having problems.        Take sitz baths as directed. A sitz bath is a portable tub that fits into the toilet basin. You can also soak in a bathtub that has 4 to 6 inches of warm water. Stay in the sitz bath or tub for 15 to 20 minutes. Ask your healthcare provider how often to do this.    Apply warm compresses as directed. A warm compress may help relieve your symptoms. To make a warm compress, soak a small towel in warm water. Apply the compress to the area for 15 to 20 minutes. Ask your healthcare provider how often to do this.    Do not insert anything into your rectum unless directed. Items such as rectal thermometers and suppositories can cause damage while you are healing.  When should I seek immediate care?    You have severe pain during a bowel movement, or pain that lasts for hours afterward.    You see blood in your bowel movement.    You see a tear in the anus, or a skin tag near the tear.    You have itching, burning, or irritation near your anus.    You have new or worsening pain or other symptoms.  When should I contact my healthcare provider?    You have questions or concerns about your condition or care.    CARE AGREEMENT:    You have the right to help plan your care. Learn about your health condition and how it may be treated. Discuss treatment options with your healthcare providers to decide what care you want to receive. You always have the right to refuse treatment.

## 2023-08-08 NOTE — ED PROVIDER NOTE - NS ED ATTENDING STATEMENT MOD
This was a shared visit with the ROMEO. I reviewed and verified the documentation and independently performed the documented:

## 2023-08-11 ENCOUNTER — APPOINTMENT (OUTPATIENT)
Dept: COLORECTAL SURGERY | Facility: CLINIC | Age: 48
End: 2023-08-11
Payer: COMMERCIAL

## 2023-08-11 VITALS
OXYGEN SATURATION: 99 % | DIASTOLIC BLOOD PRESSURE: 77 MMHG | BODY MASS INDEX: 32.84 KG/M2 | SYSTOLIC BLOOD PRESSURE: 134 MMHG | WEIGHT: 216 LBS | RESPIRATION RATE: 16 BRPM | TEMPERATURE: 98.5 F | HEART RATE: 66 BPM

## 2023-08-11 PROCEDURE — 99213 OFFICE O/P EST LOW 20 MIN: CPT

## 2023-08-11 RX ORDER — ONDANSETRON 4 MG/1
4 TABLET ORAL
Qty: 21 | Refills: 0 | Status: ACTIVE | COMMUNITY
Start: 2022-12-14 | End: 1900-01-01

## 2023-08-11 RX ORDER — HYDROCHLOROTHIAZIDE 50 MG/5 ML
SOLUTION, ORAL ORAL
Refills: 0 | Status: COMPLETED | COMMUNITY
End: 2023-08-11

## 2023-08-11 NOTE — PHYSICAL EXAM
[Abdomen Masses] : No abdominal masses [Abdomen Tenderness] : ~T No ~M abdominal tenderness [No HSM] : no hepatosplenomegaly [No Rash or Lesion] : No rash or lesion [Alert] : alert [Oriented to Person] : oriented to person [Oriented to Place] : oriented to place [Oriented to Time] : oriented to time [Calm] : calm [de-identified] : Soft [de-identified] : External - scars of previous surgeries; JOSUÉ was painful; therefore not completely done [de-identified] : Normal [de-identified] : Normal [de-identified] : Normal [de-identified] : Normal [de-identified] : Normal [de-identified] : Normal

## 2023-08-11 NOTE — HISTORY OF PRESENT ILLNESS
[FreeTextEntry1] : 48 year old female pt last seen on 6/23/23.  Pt s/p colostomy reversal 12/8/22  History of Crohn's anal fistula abscess followed by Dr. Plata at [facility] Here for follow up.  Was in the ER on Monday 8/7/23 for anorectal pain.  They did a CT, which a showed posterior perianal abscess measuring up to 2.2 cm.  Was told it was too small to drain.  WBC normal Started on Augmentin. Has been doing sitz baths. Feels a little better but still has pain. Yesterday has a little diarrhea.  Up until Monday was moving her bowels regularly. Denies bleeding. Denies fevers or chills. Wants to double check she can take the Humira today Seeing Dr. Plata on 8/18/23

## 2023-08-11 NOTE — REVIEW OF SYSTEMS
[As Noted in HPI] : as noted in HPI [Negative] : Heme/Lymph [FreeTextEntry4] : Had strep throat in July and was given Amoxicillin for 10 days

## 2023-08-11 NOTE — ASSESSMENT
[FreeTextEntry1] : 48 F, w/ hx of Crohn's, was recently seen in the ER, and fond to have a posterior perianal abscess. No fever. She is currently on Augmentin and feels better. Plan: Holf off on today's Humira dose. Complete antibiotic course. Sitz baths. To see Dr Plata next week, as scheduled. Come back and see us after that. All questions answered.

## 2023-08-17 DIAGNOSIS — B37.31 ACUTE CANDIDIASIS OF VULVA AND VAGINA: ICD-10-CM

## 2023-09-08 ENCOUNTER — APPOINTMENT (OUTPATIENT)
Dept: COLORECTAL SURGERY | Facility: CLINIC | Age: 48
End: 2023-09-08
Payer: COMMERCIAL

## 2023-09-08 VITALS
DIASTOLIC BLOOD PRESSURE: 103 MMHG | HEART RATE: 66 BPM | BODY MASS INDEX: 32.99 KG/M2 | RESPIRATION RATE: 16 BRPM | OXYGEN SATURATION: 98 % | SYSTOLIC BLOOD PRESSURE: 166 MMHG | TEMPERATURE: 98.2 F | WEIGHT: 217 LBS

## 2023-09-08 PROCEDURE — 99213 OFFICE O/P EST LOW 20 MIN: CPT

## 2023-09-08 RX ORDER — OXYCODONE 5 MG/1
5 TABLET ORAL
Refills: 0 | Status: COMPLETED | COMMUNITY
End: 2023-09-08

## 2023-09-08 RX ORDER — FLUCONAZOLE 150 MG/1
150 TABLET ORAL
Qty: 1 | Refills: 0 | Status: COMPLETED | COMMUNITY
Start: 2023-08-17 | End: 2023-09-08

## 2023-09-08 RX ORDER — AMOXICILLIN AND CLAVULANATE POTASSIUM 875; 125 MG/1; 1/1
875-125 TABLET, FILM COATED ORAL
Refills: 0 | Status: COMPLETED | COMMUNITY
End: 2023-09-08

## 2023-09-08 NOTE — HISTORY OF PRESENT ILLNESS
[FreeTextEntry1] : 48-year-old female pt s/p colostomy reversal 12/8/22.  History of Crohn's Disease  Last seen on 8/11/23 for perianal abscess Follows with Dr. Plata at [facility]  Here for follow up.  Has not follow up with Dr. Plata yet.  Needs to reschedule with him.  Pt feeling much better Denies any anal drainage. Denies fevers or chills. Denies pain. Moving her bowels regularly Denies bleeding. Takes Humira every other week but hasn't it since she had the infection Plans on taking it today.  Last colonoscopy 12/2022- Normal

## 2023-09-08 NOTE — PHYSICAL EXAM
[No Rash or Lesion] : No rash or lesion [Alert] : alert [Oriented to Person] : oriented to person [Oriented to Place] : oriented to place [Oriented to Time] : oriented to time [Calm] : calm [de-identified] : Normal [de-identified] : External - scars of previous surgeries; no abscess or fistula [de-identified] : Normal [de-identified] : Normal [de-identified] : Normal [de-identified] : Normal [de-identified] : Normal [de-identified] : Normal

## 2023-09-08 NOTE — ASSESSMENT
[FreeTextEntry1] : 48 F, w/ hx of Crohn's, here for follow up. She is doing well; no abscess or fistula. Plan: Can restart Humira. Perianal hygiene.  Follow up w/ Dr Plata. See again after 3 months. All questions answered.

## 2023-10-10 NOTE — PATIENT PROFILE ADULT - FALL HARM RISK - CONCLUSION
Universal Safety Interventions Calcipotriene Counseling:  I discussed with the patient the risks of calcipotriene including but not limited to erythema, scaling, itching, and irritation.

## 2023-12-12 ENCOUNTER — RX RENEWAL (OUTPATIENT)
Age: 48
End: 2023-12-12

## 2023-12-14 ENCOUNTER — NON-APPOINTMENT (OUTPATIENT)
Age: 48
End: 2023-12-14

## 2023-12-15 ENCOUNTER — APPOINTMENT (OUTPATIENT)
Dept: COLORECTAL SURGERY | Facility: CLINIC | Age: 48
End: 2023-12-15
Payer: COMMERCIAL

## 2023-12-15 VITALS — DIASTOLIC BLOOD PRESSURE: 105 MMHG | SYSTOLIC BLOOD PRESSURE: 163 MMHG

## 2023-12-15 VITALS
RESPIRATION RATE: 16 BRPM | OXYGEN SATURATION: 98 % | DIASTOLIC BLOOD PRESSURE: 120 MMHG | TEMPERATURE: 98.1 F | BODY MASS INDEX: 31.52 KG/M2 | WEIGHT: 208 LBS | SYSTOLIC BLOOD PRESSURE: 170 MMHG | HEART RATE: 79 BPM | HEIGHT: 68 IN

## 2023-12-15 PROCEDURE — 99213 OFFICE O/P EST LOW 20 MIN: CPT

## 2023-12-15 RX ORDER — NIFEDIPINE 60 MG/1
60 TABLET, FILM COATED, EXTENDED RELEASE ORAL DAILY
Qty: 7 | Refills: 0 | Status: ACTIVE | COMMUNITY
Start: 2023-12-15 | End: 1900-01-01

## 2023-12-15 RX ORDER — NIFEDIPINE 60 MG/1
60 TABLET, FILM COATED, EXTENDED RELEASE ORAL
Refills: 0 | Status: ACTIVE | COMMUNITY

## 2023-12-15 RX ORDER — NIFEDIPINE 20 MG/1
CAPSULE ORAL
Refills: 0 | Status: ACTIVE | COMMUNITY

## 2023-12-15 NOTE — ASSESSMENT
[FreeTextEntry1] : 48 F here for follow up. Doing well. Feels tired (after starting back at work). Regular bowel movements; eating 'healthy'. Is on remicade. Plan: Follow up with her GI MD and Internist. Ask them whether it is safe for her to take the yellow fever vaccine. Continue treatment for crohn's. See again as needed. All questions answered.

## 2023-12-15 NOTE — PHYSICAL EXAM
[No Rash or Lesion] : No rash or lesion [Alert] : alert [Oriented to Person] : oriented to person [Oriented to Place] : oriented to place [Oriented to Time] : oriented to time [Calm] : calm [de-identified] : Normal [de-identified] : External - scars of previous surgeries; no abscess or fistula [de-identified] : Normal [de-identified] : Normal [de-identified] : Normal [de-identified] : Normal [de-identified] : Normal [de-identified] : Normal

## 2023-12-15 NOTE — HISTORY OF PRESENT ILLNESS
[FreeTextEntry1] : 48-year-old female pt s/p colostomy reversal 12/8/22.  History of Crohn's Disease.  Follows up with Dr. Plata at [facility].  Last seen 9/8/23.  Here for follow up visit. Pt overall been feeling great. 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  The commute is stressful. Occasional nausea with certain foods Moving her bowels regularly, notices the color has changed more solo, has green tea with honey for breakfast, sandwich, egg with avocado and salad for lunch, dinner usually rice, baked chicken, piece of steak or fish string beans, carrots, stays away from gassy foods.  Denies bleeding.  Reports consistency of stool has changed from firmness to looser but not diarrhea just more soft. Denies abdominal pain or previous surgical site pain Reports occasional skin tearing "like a paper cut" in the anal area Primary complaint is tiredness, denies lightheadedness or dizziness, more lethargic. Typically gets between 6-7 hours of sleep at night. Gets up a 5a. Planning a 2 week trip to [place] in June. Was told she needs a yellow fever vaccine and wants to know if she can have it.  Concerned about the foods in [place] may affect her. Planning to make an appointment with her PMD and Dr. Plata

## 2024-01-19 ENCOUNTER — APPOINTMENT (OUTPATIENT)
Dept: COLORECTAL SURGERY | Facility: CLINIC | Age: 49
End: 2024-01-19
Payer: COMMERCIAL

## 2024-01-19 VITALS
HEIGHT: 68 IN | SYSTOLIC BLOOD PRESSURE: 145 MMHG | DIASTOLIC BLOOD PRESSURE: 95 MMHG | HEART RATE: 64 BPM | WEIGHT: 206 LBS | BODY MASS INDEX: 31.22 KG/M2 | OXYGEN SATURATION: 100 % | RESPIRATION RATE: 16 BRPM

## 2024-01-19 PROCEDURE — 99213 OFFICE O/P EST LOW 20 MIN: CPT

## 2024-01-19 NOTE — ASSESSMENT
[FreeTextEntry1] : 49 F w/ Crohn's, here for follow up. On exam, there was no anal fistula or abscess. Plan: Patient reassured. Continue with treatment under Dr Plata's care. See again after 3 months. All questions answered.

## 2024-01-19 NOTE — PHYSICAL EXAM
[No Rash or Lesion] : No rash or lesion [Alert] : alert [Oriented to Person] : oriented to person [Oriented to Place] : oriented to place [Oriented to Time] : oriented to time [Calm] : calm [de-identified] : Normal [de-identified] : External - scars of previous surgeries; no abscess or fistula [de-identified] : Normal [de-identified] : Normal [de-identified] : Normal [de-identified] : Normal [de-identified] : Normal [de-identified] : Normal

## 2024-01-19 NOTE — HISTORY OF PRESENT ILLNESS
[FreeTextEntry1] : 49 F here for follow up. She went to her local ER earlier this week because she had pain at the anus, and was worried about developing an abscess. Was examined and underwent a CT scan: no abscess or fistula was found.  Doing well today; no fever; no pain; having regular bowel movements.  She has hx of Crohn's, and is on medication (Dr Plata @ [facility]). She is s/p anal fistula surgeries and colostomy reversal 12/8/22.

## 2024-06-14 ENCOUNTER — NON-APPOINTMENT (OUTPATIENT)
Age: 49
End: 2024-06-14

## (undated) DEVICE — POOLE SUCTION TIP

## (undated) DEVICE — WARMING BLANKET UPPER ADULT

## (undated) DEVICE — ELCTR STRYKER NEPTUNE BLADE COATED, INSULATED 70MM

## (undated) DEVICE — PREP BETADINE SPONGE STICKS

## (undated) DEVICE — SUCTION YANKAUER BULBOUS TIP W VENT

## (undated) DEVICE — SUT SILK 3-0 18" SH (POP-OFF)

## (undated) DEVICE — SUT VICRYL 3-0 18" SH (POP-OFF)

## (undated) DEVICE — DRSG TELFA 3 X 8

## (undated) DEVICE — LIGASURE EXACT DISSECTOR

## (undated) DEVICE — SUT CHROMIC 2-0 27" CT-2

## (undated) DEVICE — PACK PERI GYN

## (undated) DEVICE — STOPCOCK 4-WAY

## (undated) DEVICE — SPECIMEN CONTAINER 100ML

## (undated) DEVICE — DRAPE 3/4 SHEET 52X76"

## (undated) DEVICE — GLV 7.5 PROTEXIS (WHITE)

## (undated) DEVICE — DRAPE 1/2 SHEET 40X57"

## (undated) DEVICE — POSITIONER STRAP KNEE & BODY 3X60" DISP

## (undated) DEVICE — DRSG GAUZE MOISTURIZER 0.5 OZ 4X8

## (undated) DEVICE — PACK COLO RECTAL

## (undated) DEVICE — LUBRICATING JELLY ONESHOT 1.25OZ

## (undated) DEVICE — STAPLER COVIDIEN ENDO GIA SHORT HANDLE

## (undated) DEVICE — SUT VICRYL 2-0 27" SH

## (undated) DEVICE — SYR LUER LOK 30CC

## (undated) DEVICE — TAPE SILK 3"

## (undated) DEVICE — VESSEL LOOP MAXI-BLUE 0.120" X 16"

## (undated) DEVICE — GLV 8 PROTEXIS (WHITE)

## (undated) DEVICE — SUT MONOCRYL 4-0 27" PS-2 UNDYED

## (undated) DEVICE — SUT PDS II PLUS 1 27" CT-1

## (undated) DEVICE — PREP CHLORAPREP HI-LITE ORANGE 26ML

## (undated) DEVICE — GLV 6.5 PROTEXIS (WHITE)

## (undated) DEVICE — VENODYNE/SCD SLEEVE CALF MEDIUM

## (undated) DEVICE — ELCTR PENCIL SMOKE EVACUATOR COATED PUSH BUTTON 70MM

## (undated) DEVICE — NDL HYPO SAFE 22G X 1" (BLACK)

## (undated) DEVICE — SUT VICRYL 3-0 27" SH

## (undated) DEVICE — DRAPE LIGHT HANDLE COVER (BLUE)

## (undated) DEVICE — DRSG CURITY GAUZE SPONGE 4 X 8" 12-PLY NON-STERILE

## (undated) DEVICE — STAPLER SKIN PROXIMATE

## (undated) DEVICE — PACK GENERAL CLOSING

## (undated) DEVICE — SYR ASEPTO

## (undated) DEVICE — ONETRAC LIGHTED RETRACTOR 90 X 22MM DISP

## (undated) DEVICE — GOWN ROYAL SILK XL

## (undated) DEVICE — SPECIMEN CONTAINER 4OZ

## (undated) DEVICE — NDL HYPO SAFE 22G X 1.5" (BLACK)

## (undated) DEVICE — POSITIONER FOAM EGG CRATE ULNAR 2PCS (PINK)

## (undated) DEVICE — DRAPE TOWEL BLUE 17" X 24"

## (undated) DEVICE — PACK EXPLORATORY LAPAROTOMY